# Patient Record
Sex: MALE | Race: WHITE | NOT HISPANIC OR LATINO | Employment: OTHER | URBAN - METROPOLITAN AREA
[De-identification: names, ages, dates, MRNs, and addresses within clinical notes are randomized per-mention and may not be internally consistent; named-entity substitution may affect disease eponyms.]

---

## 2023-01-30 ENCOUNTER — OFFICE VISIT (OUTPATIENT)
Dept: URGENT CARE | Facility: CLINIC | Age: 43
End: 2023-01-30
Payer: COMMERCIAL

## 2023-01-30 VITALS
WEIGHT: 245 LBS | BODY MASS INDEX: 36.29 KG/M2 | OXYGEN SATURATION: 98 % | DIASTOLIC BLOOD PRESSURE: 82 MMHG | SYSTOLIC BLOOD PRESSURE: 130 MMHG | HEART RATE: 76 BPM | HEIGHT: 69 IN | TEMPERATURE: 99 F

## 2023-01-30 DIAGNOSIS — L25.9 CONTACT DERMATITIS, UNSPECIFIED CONTACT DERMATITIS TYPE, UNSPECIFIED TRIGGER: Primary | ICD-10-CM

## 2023-01-30 PROCEDURE — 99202 PR OFFICE/OUTPT VISIT, NEW, LEVL II, 15-29 MIN: ICD-10-PCS | Mod: S$GLB,,, | Performed by: NURSE PRACTITIONER

## 2023-01-30 PROCEDURE — 99202 OFFICE O/P NEW SF 15 MIN: CPT | Mod: S$GLB,,, | Performed by: NURSE PRACTITIONER

## 2023-01-30 RX ORDER — PREDNISONE 20 MG/1
20 TABLET ORAL DAILY
Qty: 4 TABLET | Refills: 0 | Status: SHIPPED | OUTPATIENT
Start: 2023-01-30 | End: 2023-02-03

## 2023-01-30 NOTE — PROGRESS NOTES
"Subjective:       Patient ID: Wiliam Nagel is a 42 y.o. male.    Vitals:  height is 5' 9" (1.753 m) and weight is 111.1 kg (245 lb). His oral temperature is 98.5 °F (36.9 °C). His blood pressure is 130/82 and his pulse is 76. His oxygen saturation is 98%.     Chief Complaint: Rash    This is a 42 y.o. male who presents today with a chief complaint of rash on face, hands and groin area x 1 day.      Rash  This is a new problem. The current episode started yesterday. The problem is unchanged. The affected locations include the right hand and groin. The rash is characterized by itchiness. He was exposed to nothing. Past treatments include nothing. The treatment provided no relief.     Skin:  Positive for rash.     Objective:      Physical Exam   Constitutional: obesity  HENT:   Head: Normocephalic and atraumatic.   Nose: Nose normal.   Mouth/Throat: Mucous membranes are moist. Oropharynx is clear.   Eyes: Conjunctivae are normal. Pupils are equal, round, and reactive to light. Extraocular movement intact   Neck: Neck supple.   Cardiovascular: Normal rate, regular rhythm, normal heart sounds and normal pulses.   Pulmonary/Chest: Effort normal and breath sounds normal.   Abdominal: Normal appearance.   Musculoskeletal: Normal range of motion.         General: Normal range of motion.   Neurological: He is alert.   Skin: Skin is warm, dry and rash.         Comments: Mildly erythematous, pruritic, maculopapular rash to face, wrist area, and groin. Consistent in appearance with a contact dermatitis rash.   Psychiatric: His behavior is normal. Mood normal.   Vitals reviewed.      Assessment:       1. Contact dermatitis, unspecified contact dermatitis type, unspecified trigger          Plan:         Contact dermatitis, unspecified contact dermatitis type, unspecified trigger  -     predniSONE (DELTASONE) 20 MG tablet; Take 1 tablet (20 mg total) by mouth once daily. for 4 days  Dispense: 4 tablet; Refill: 0                     "

## 2023-08-22 ENCOUNTER — OFFICE VISIT (OUTPATIENT)
Dept: URGENT CARE | Facility: CLINIC | Age: 43
End: 2023-08-22

## 2023-08-22 VITALS
HEART RATE: 70 BPM | WEIGHT: 260 LBS | BODY MASS INDEX: 38.51 KG/M2 | HEIGHT: 69 IN | SYSTOLIC BLOOD PRESSURE: 136 MMHG | TEMPERATURE: 98 F | OXYGEN SATURATION: 98 % | DIASTOLIC BLOOD PRESSURE: 78 MMHG

## 2023-08-22 DIAGNOSIS — H60.502 ACUTE OTITIS EXTERNA OF LEFT EAR, UNSPECIFIED TYPE: Primary | ICD-10-CM

## 2023-08-22 PROCEDURE — 99213 PR OFFICE/OUTPT VISIT, EST, LEVL III, 20-29 MIN: ICD-10-PCS | Mod: S$GLB,,, | Performed by: NURSE PRACTITIONER

## 2023-08-22 PROCEDURE — 99213 OFFICE O/P EST LOW 20 MIN: CPT | Mod: S$GLB,,, | Performed by: NURSE PRACTITIONER

## 2023-08-22 RX ORDER — CEFDINIR 300 MG/1
300 CAPSULE ORAL 2 TIMES DAILY
COMMUNITY
Start: 2023-08-15 | End: 2023-09-01 | Stop reason: ALTCHOICE

## 2023-08-22 RX ORDER — ATORVASTATIN CALCIUM 20 MG/1
20 TABLET, FILM COATED ORAL NIGHTLY
COMMUNITY
Start: 2023-03-14 | End: 2023-08-30 | Stop reason: SDUPTHER

## 2023-08-22 RX ORDER — PSEUDOEPHEDRINE HCL 30 MG
30 TABLET ORAL 3 TIMES DAILY
COMMUNITY
Start: 2023-08-15 | End: 2023-09-01

## 2023-08-22 RX ORDER — CIPROFLOXACIN 500 MG/1
500 TABLET ORAL EVERY 12 HOURS
Qty: 14 TABLET | Refills: 0 | Status: SHIPPED | OUTPATIENT
Start: 2023-08-22 | End: 2023-08-29

## 2023-08-22 NOTE — PROGRESS NOTES
"Subjective:       Patient ID: Wiliam Nagel is a 43 y.o. male.    Vitals:  height is 5' 9" (1.753 m) and weight is 117.9 kg (260 lb). His oral temperature is 98.2 °F (36.8 °C). His blood pressure is 136/78 and his pulse is 70. His oxygen saturation is 98%.     Chief Complaint: Otalgia (Seen by ENT last week and given an steroid pack and an antibiotic.  X 3 days after getting water in left ear, ear feels clogged and has a slight pain.)    This is a 43 y.o. male who presents today with a chief complaint of left ear pain 3 days after swimming in ocean. Patient reports that he was seen by ENT last week and given antibiotic and steroid rx.    Otalgia   There is pain in the left ear. This is a new problem. The current episode started in the past 7 days. The problem has been unchanged. The pain is at a severity of 5/10. The pain is moderate. He has tried antibiotics (steroids) for the symptoms. The treatment provided moderate relief.       HENT:  Positive for ear pain.            Objective:      Physical Exam   Constitutional: He is oriented to person, place, and time. obesity  HENT:   Head: Normocephalic and atraumatic.   Ears:   Right Ear: Tympanic membrane, external ear and ear canal normal.      Comments: Left otitis externa.  Nose: Nose normal.   Mouth/Throat: Mucous membranes are moist. Oropharynx is clear.   Eyes: Conjunctivae are normal. Extraocular movement intact   Neck: Neck supple.   Cardiovascular: Normal rate, regular rhythm, normal heart sounds and normal pulses.   Pulmonary/Chest: Effort normal and breath sounds normal.   Abdominal: Normal appearance.   Musculoskeletal: Normal range of motion.         General: Normal range of motion.   Neurological: He is alert and oriented to person, place, and time.   Skin: Skin is warm and dry.   Psychiatric: His behavior is normal.   Vitals reviewed.        Past medical history and current medications reviewed.       Assessment:           1. Acute otitis externa of left " ear, unspecified type              Plan:     Stop antibiotic prescribed by ENT. Cipro as prescribed. Follow up with ENT asa advised.    Acute otitis externa of left ear, unspecified type  -     ciprofloxacin HCl (CIPRO) 500 MG tablet; Take 1 tablet (500 mg total) by mouth every 12 (twelve) hours. for 7 days  Dispense: 14 tablet; Refill: 0             There are no Patient Instructions on file for this visit.

## 2023-09-01 ENCOUNTER — OFFICE VISIT (OUTPATIENT)
Dept: URGENT CARE | Facility: CLINIC | Age: 43
End: 2023-09-01

## 2023-09-01 VITALS
SYSTOLIC BLOOD PRESSURE: 138 MMHG | HEIGHT: 69 IN | BODY MASS INDEX: 38.51 KG/M2 | TEMPERATURE: 98 F | WEIGHT: 260 LBS | DIASTOLIC BLOOD PRESSURE: 78 MMHG | HEART RATE: 68 BPM | OXYGEN SATURATION: 98 %

## 2023-09-01 DIAGNOSIS — H60.92 OTITIS EXTERNA OF LEFT EAR, UNSPECIFIED CHRONICITY, UNSPECIFIED TYPE: Primary | ICD-10-CM

## 2023-09-01 PROCEDURE — 99213 OFFICE O/P EST LOW 20 MIN: CPT | Mod: S$GLB,,, | Performed by: NURSE PRACTITIONER

## 2023-09-01 PROCEDURE — 99213 PR OFFICE/OUTPT VISIT, EST, LEVL III, 20-29 MIN: ICD-10-PCS | Mod: S$GLB,,, | Performed by: NURSE PRACTITIONER

## 2023-09-01 RX ORDER — NEOMYCIN SULFATE, POLYMYXIN B SULFATE AND HYDROCORTISONE 10; 3.5; 1 MG/ML; MG/ML; [USP'U]/ML
3 SUSPENSION/ DROPS AURICULAR (OTIC) 3 TIMES DAILY
Qty: 10 ML | Refills: 0 | Status: SHIPPED | OUTPATIENT
Start: 2023-09-01

## 2023-09-01 RX ORDER — PREDNISONE 10 MG/1
TABLET ORAL
Qty: 8 TABLET | Refills: 0 | Status: SHIPPED | OUTPATIENT
Start: 2023-09-01

## 2023-09-01 NOTE — PROGRESS NOTES
"Subjective:       Patient ID: Wiliam Nagel is a 43 y.o. male.    Vitals:  height is 5' 9" (1.753 m) and weight is 117.9 kg (260 lb). His oral temperature is 97.9 °F (36.6 °C). His blood pressure is 138/78 and his pulse is 68. His oxygen saturation is 98%.     Chief Complaint: ear feels clogged (Left ear pain getting worse over the last 4-5 weeks.  Seen 8/22/23 and it was getting better, then x 5 days ago  it started to get worse again.) and Gastroesophageal Reflux (States at night he's having reflux at night and wants to know what to take.)    This is a 43 y.o. male who presents today with a chief complaint of Left ear pain getting worse over the last 4-5 weeks.  Seen 8/22/23 and it was getting better, then x 5 days ago  it started to get worse again. Also, states at night he's having reflux at night and wants to know what to take.  Patient presents with:  ear feels clogged: Left ear pain getting worse over the last 4-5 weeks.  Seen 8/22/23 and it was getting better, then x 5 days ago  it started to get worse again.  Gastroesophageal Reflux: States at night he's having reflux at night and wants to know what to take.         Gastroesophageal Reflux  He complains of heartburn. He has tried nothing for the symptoms. The treatment provided no relief.       Gastrointestinal:  Positive for heartburn.           Objective:      Physical Exam   Constitutional: He is oriented to person, place, and time. He appears well-developed.   HENT:   Head: Normocephalic and atraumatic.   Ears:   Right Ear: External ear normal.   Left Ear: External ear normal.   Nose: Nose normal.   Mouth/Throat: Mucous membranes are normal.   Eyes: Conjunctivae and lids are normal.   Neck: Trachea normal. Neck supple.   Cardiovascular: Normal rate, regular rhythm and normal heart sounds.   Pulmonary/Chest: Effort normal and breath sounds normal. No respiratory distress.   Abdominal: Normal appearance and bowel sounds are normal. He exhibits no distension " and no mass. Soft. There is no abdominal tenderness.   Musculoskeletal: Normal range of motion.         General: Normal range of motion.   Neurological: He is alert and oriented to person, place, and time. He has normal strength.   Skin: Skin is warm, dry, intact, not diaphoretic and not pale.   Psychiatric: His speech is normal and behavior is normal. Judgment and thought content normal.   Nursing note and vitals reviewed.        Past medical history and current medications reviewed.       Assessment:           1. Otitis externa of left ear, unspecified chronicity, unspecified type              Plan:         Otitis externa of left ear, unspecified chronicity, unspecified type  -     predniSONE (DELTASONE) 10 MG tablet; Take 2 tabs today, then 1 tab po qd x 6 days  Dispense: 8 tablet; Refill: 0  -     neomycin-polymyxin-hydrocortisone (CORTISPORIN) 3.5-10,000-1 mg/mL-unit/mL-% otic suspension; Place 3 drops into the left ear 3 (three) times daily.  Dispense: 10 mL; Refill: 0  -     Ambulatory referral/consult to ENT             Patient Instructions     You must understand that you've received an Urgent Care treatment only and that you may be released before all your medical problems are known or treated. You, the patient, will arrange for follow up care as instructed.  Follow up with your PCP or specialty clinic as directed in the next 1-2 weeks if not improved or as needed.  You can call [phone] to schedule an appointment with the appropriate provider.  If your condition worsens we recommend that you receive another evaluation at the emergency room immediately or contact your primary medical clinics after hours call service to discuss your concerns.  Please return here or go to the Emergency Department for any concerns or worsening of condition.  Please if you smoke please consider quitting. [facility] Sm[facility] cessation hotline number is [phone], available at this number is free counseling and medications to  live a healthier life!       If you were prescribed a narcotic or controlled medication, do not drive or operate heavy equipment or machinery while taking these medications.    If you were not prescribed an antibiotic and your not better please return for a recheck. Antibiotic therapy is not always indicated initially.   Please attempt over the counter medications, give it time and try Echinacea, Zinc and Vitamin C to fight common colds and virus.

## 2023-09-01 NOTE — PATIENT INSTRUCTIONS
You must understand that you've received an Urgent Care treatment only and that you may be released before all your medical problems are known or treated. You, the patient, will arrange for follow up care as instructed.  Follow up with your PCP or specialty clinic as directed in the next 1-2 weeks if not improved or as needed.  You can call [phone] to schedule an appointment with the appropriate provider.  If your condition worsens we recommend that you receive another evaluation at the emergency room immediately or contact your primary medical clinics after hours call service to discuss your concerns.  Please return here or go to the Emergency Department for any concerns or worsening of condition.  Please if you smoke please consider quitting. [facility] Smoke cessation hotline number is [phone], available at this number is free counseling and medications to live a healthier life!       If you were prescribed a narcotic or controlled medication, do not drive or operate heavy equipment or machinery while taking these medications.    If you were not prescribed an antibiotic and your not better please return for a recheck. Antibiotic therapy is not always indicated initially.   Please attempt over the counter medications, give it time and try Echinacea, Zinc and Vitamin C to fight common colds and virus.

## 2023-09-05 ENCOUNTER — OFFICE VISIT (OUTPATIENT)
Dept: OTOLARYNGOLOGY | Facility: CLINIC | Age: 43
End: 2023-09-05

## 2023-09-05 VITALS
SYSTOLIC BLOOD PRESSURE: 150 MMHG | BODY MASS INDEX: 39.27 KG/M2 | WEIGHT: 265.88 LBS | DIASTOLIC BLOOD PRESSURE: 92 MMHG

## 2023-09-05 DIAGNOSIS — J32.9 CHRONIC SINUSITIS, UNSPECIFIED LOCATION: Primary | ICD-10-CM

## 2023-09-05 DIAGNOSIS — H60.532 ACUTE CONTACT OTITIS EXTERNA OF LEFT EAR: ICD-10-CM

## 2023-09-05 PROCEDURE — 99203 OFFICE O/P NEW LOW 30 MIN: CPT | Mod: S$PBB,,, | Performed by: OTOLARYNGOLOGY

## 2023-09-05 PROCEDURE — 99999 PR PBB SHADOW E&M-EST. PATIENT-LVL II: CPT | Mod: PBBFAC,,, | Performed by: OTOLARYNGOLOGY

## 2023-09-05 PROCEDURE — 99203 PR OFFICE/OUTPT VISIT, NEW, LEVL III, 30-44 MIN: ICD-10-PCS | Mod: S$PBB,,, | Performed by: OTOLARYNGOLOGY

## 2023-09-05 PROCEDURE — 99212 OFFICE O/P EST SF 10 MIN: CPT | Mod: PBBFAC,PN | Performed by: OTOLARYNGOLOGY

## 2023-09-05 PROCEDURE — 99999 PR PBB SHADOW E&M-EST. PATIENT-LVL II: ICD-10-PCS | Mod: PBBFAC,,, | Performed by: OTOLARYNGOLOGY

## 2023-09-05 RX ORDER — SULFAMETHOXAZOLE AND TRIMETHOPRIM 800; 160 MG/1; MG/1
1 TABLET ORAL 2 TIMES DAILY
Qty: 42 TABLET | Refills: 0 | Status: SHIPPED | OUTPATIENT
Start: 2023-09-05 | End: 2023-09-26

## 2023-09-05 NOTE — PROGRESS NOTES
Subjective:       Patient ID: Wiliam Nagel is a 43 y.o. male.    Chief Complaint: Otalgia (Pt c/o ear pain for a couple weeks after having a sinus infection. Pt has been seen at urgent care and  and has been prescribed different antibiotics and steroid which nothing seems to help. )      This 43-year-old for about six or seven weeks has been having a couple of problems that seemed to begin then with initially some left paranasal sinus pain left ear congestion but he never had any reduction in his hearing.  He was treated with antibiotics for presumed sinusitis he is had a couple of rounds of antibiotics and some steroids also they have kept the steroids light because of blood pressure issues although he does not have perfectly good records of exactly how much in what he has been given.    On the available electronic medical record I see that he recently was given Cortisporin suspension for his ear an appropriate choice for most external otitis if in fact that is a factor here.    Throughout our office visit he is sniffling and he tells me he has chronic sinus problems he has all his life he does have Astepro and Flonase that he bought over-the-counter but he is reluctant to use them for some reason which we have discussed.    Otalgia         Objective:      ENT Physical Exam  Constitutional  Appearance: patient appears well-developed, well-nourished and well-groomed,  Communication/Voice: communication appropriate for developmental age; vocal quality normal;  Head and Face  Appearance: head appears normal, face appears normal and face appears atraumatic;  Salivary: glands normal;  Ear  Hearing: intact;  Auricles: right auricle normal; left auricle normal;  Ear Canals: right ear canal normal;  Tympanic Membranes: right tympanic membrane normal;  Ear comments: On the left side is ear canal has a film of Cortisporin suspension but it does not have any swelling or debris or redness.  There is a little wax on the  right but I cleaned that out it is an incidental finding otherwise that looks fine  Nose  External Nose: nares patent bilaterally; external nose normal;  Internal Nose: septum normal; bilateral inferior turbinates normal;  Nose comments: His nose looks really quite congested there is no purulence but there was little film on the lateral nasal wall on the left of white material and he is sniffling throughout the entire visit  Oral Cavity/Oropharynx  Lips: normal;  Teeth: normal;  Gums: gingiva normal;  Tongue: normal;  Oral mucosa: normal;  Hard palate: normal;  Soft palate: normal;  Tonsils: normal;  Posterior pharyngeal wall: normal;  Neck  Neck: neck normal; neck palpation normal;  Thyroid: thyroid normal;  Lymphatic  Palpation: lymph nodes normal;          Assessment:       1. Chronic sinusitis, unspecified location    2. Acute contact otitis externa of left ear         Plan:          Some eye exactly sure but I bet he has low chronic sinusitis.  He tells me his hearing is fine so I do not think he is had otitis media and he has not noticed a hearing problem through any of this if he does have an external otitis it is without swelling or debris and there is a film of drop material that is seems to bother him in fact he did not use his drops today because he does not like the feeling of the liquid in his ear.    I have mentioned to him that he can rinse his ear out with vinegar water and I gave him a small syringe to expedite that just to get any buildup of eardrops out if that should bother him the way he mentions     With his nasal congestion we did discuss x-rays but I think he should just treat it for a longer period of time and I gave him three weeks of Bactrim and if that has not been helpful he is going to contact me and we will discuss x-rays her different antibiotics.  He has plenty of drops left and I have asked him to reinstitute the drops in case external otitis has a factor and if it is not a factor  it should be negative for whatever problems going on perhaps ear feelings from sinusitis.

## 2024-01-17 ENCOUNTER — OFFICE VISIT (OUTPATIENT)
Dept: URGENT CARE | Facility: CLINIC | Age: 44
End: 2024-01-17
Payer: COMMERCIAL

## 2024-01-17 VITALS
TEMPERATURE: 99 F | SYSTOLIC BLOOD PRESSURE: 140 MMHG | WEIGHT: 270 LBS | RESPIRATION RATE: 18 BRPM | DIASTOLIC BLOOD PRESSURE: 84 MMHG | HEART RATE: 83 BPM | OXYGEN SATURATION: 98 % | BODY MASS INDEX: 39.99 KG/M2 | HEIGHT: 69 IN

## 2024-01-17 DIAGNOSIS — J02.9 SORE THROAT: Primary | ICD-10-CM

## 2024-01-17 LAB
CTP QC/QA: YES
POC MOLECULAR INFLUENZA A AGN: NEGATIVE
POC MOLECULAR INFLUENZA B AGN: NEGATIVE

## 2024-01-17 PROCEDURE — 87502 INFLUENZA DNA AMP PROBE: CPT | Mod: QW,,, | Performed by: NURSE PRACTITIONER

## 2024-01-17 PROCEDURE — 99213 OFFICE O/P EST LOW 20 MIN: CPT | Mod: S$GLB,,, | Performed by: NURSE PRACTITIONER

## 2024-01-17 RX ORDER — PREDNISONE 10 MG/1
TABLET ORAL
Qty: 8 TABLET | Refills: 0 | Status: SHIPPED | OUTPATIENT
Start: 2024-01-17 | End: 2024-06-18

## 2024-01-17 RX ORDER — PROMETHAZINE HYDROCHLORIDE AND DEXTROMETHORPHAN HYDROBROMIDE 6.25; 15 MG/5ML; MG/5ML
5 SYRUP ORAL EVERY 4 HOURS PRN
Qty: 120 ML | Refills: 0 | Status: SHIPPED | OUTPATIENT
Start: 2024-01-17 | End: 2024-01-27

## 2024-01-17 RX ORDER — AMOXICILLIN AND CLAVULANATE POTASSIUM 875; 125 MG/1; MG/1
1 TABLET, FILM COATED ORAL 2 TIMES DAILY
COMMUNITY
Start: 2024-01-11 | End: 2024-06-13 | Stop reason: ALTCHOICE

## 2024-01-17 RX ORDER — FLUTICASONE PROPIONATE 50 MCG
1 SPRAY, SUSPENSION (ML) NASAL DAILY
Qty: 16 G | Refills: 0 | Status: SHIPPED | OUTPATIENT
Start: 2024-01-17

## 2024-01-17 NOTE — PROGRESS NOTES
"Subjective:       Patient ID: Roscoe Nagel is a 43 y.o. male.    Vitals:  height is 5' 9" (1.753 m) and weight is 122.5 kg (270 lb). His oral temperature is 98.5 °F (36.9 °C). His blood pressure is 140/84 (abnormal) and his pulse is 83. His respiration is 18 and oxygen saturation is 98%.     Chief Complaint: Sore Throat (Sore throat since Monday   Headache  fatigue since yesterday)    This is a 43 y.o. male who presents today with a chief complaint of  Patient presents with:  Sore Throat: Sore throat since Monday   Headache  fatigue since yesterday   Patient was at urgent care in [place] last week and received antibiotics there           Sore Throat   This is a new problem. The current episode started in the past 7 days. The problem has been waxing and waning. There has been no fever. Associated symptoms include congestion, coughing, diarrhea, headaches and shortness of breath. Treatments tried: mucinex     patient started taking amoxicillin as prescribed.       HENT:  Positive for congestion and sore throat.    Respiratory:  Positive for cough and shortness of breath.    Gastrointestinal:  Positive for diarrhea.   Neurological:  Positive for headaches.           Objective:      Physical Exam   Constitutional: He is oriented to person, place, and time. He appears well-developed.   HENT:   Head: Normocephalic and atraumatic.   Ears:   Right Ear: External ear normal.   Left Ear: External ear normal.   Nose: Nose normal.   Mouth/Throat: Mucous membranes are normal.   Eyes: Conjunctivae and lids are normal.   Neck: Trachea normal. Neck supple.   Cardiovascular: Normal rate, regular rhythm and normal heart sounds.   Pulmonary/Chest: Effort normal and breath sounds normal. No respiratory distress.   Abdominal: Normal appearance and bowel sounds are normal. He exhibits no distension and no mass. Soft. There is no abdominal tenderness.   Musculoskeletal: Normal range of motion.         General: Normal range of " motion.   Neurological: He is alert and oriented to person, place, and time. He has normal strength.   Skin: Skin is warm, dry, intact, not diaphoretic and not pale.   Psychiatric: His speech is normal and behavior is normal. Judgment and thought content normal.   Nursing note and vitals reviewed.        Past medical history and current medications reviewed.     We do not have any covid tests in clinic, suggested home covid testing.     Results for orders placed or performed in visit on 01/17/24   POCT Influenza A/B Molecular   Result Value Ref Range    POC Molecular Influenza A Ag Negative Negative, Not Reported    POC Molecular Influenza B Ag Negative Negative, Not Reported     Acceptable Yes        Assessment:           1. Sore throat              Plan:         Sore throat  -     POCT Influenza A/B Molecular  -     predniSONE (DELTASONE) 10 MG tablet; Take 2 tabs today, then 1 tab po qd x 6 days  Dispense: 8 tablet; Refill: 0  -     fluticasone propionate (FLONASE) 50 mcg/actuation nasal spray; 1 spray (50 mcg total) by Each Nostril route once daily.  Dispense: 16 g; Refill: 0  -     promethazine-dextromethorphan (PROMETHAZINE-DM) 6.25-15 mg/5 mL Syrp; Take 5 mLs by mouth every 4 (four) hours as needed.  Dispense: 120 mL; Refill: 0             Patient Instructions       You must understand that you've received an Urgent Care treatment only and that you may be released before all your medical problems are known or treated. You, the patient, will arrange for follow up care as instructed.  Follow up with your PCP or specialty clinic as directed in the next 1-2 weeks if not improved or as needed.  You can call [phone] to schedule an appointment with the appropriate provider.  If your condition worsens we recommend that you receive another evaluation at the emergency room immediately or contact your primary medical clinics after hours call service to discuss your concerns.  Please return here or  go to the Emergency Department for any concerns or worsening of condition.  Please if you smoke please consider quitting. [facility] Smoke cessation hotline number is [phone], available at this number is free counseling and medications to live a healthier life!       If you were prescribed a narcotic or controlled medication, do not drive or operate heavy equipment or machinery while taking these medications.    If you were not prescribed an antibiotic and your not better please return for a recheck. Antibiotic therapy is not always indicated initially.   Please attempt over the counter medications, give it time and try Echinacea, Zinc and Vitamin C to fight common colds and virus.

## 2024-01-17 NOTE — PATIENT INSTRUCTIONS
You must understand that you've received an Urgent Care treatment only and that you may be released before all your medical problems are known or treated. You, the patient, will arrange for follow up care as instructed.  Follow up with your PCP or specialty clinic as directed in the next 1-2 weeks if not improved or as needed.  You can call [phone] to schedule an appointment with the appropriate provider.  If your condition worsens we recommend that you receive another evaluation at the emergency room immediately or contact your primary medical clinics after hours call service to discuss your concerns.  Please return here or go to the Emergency Department for any concerns or worsening of condition.  Please if you smoke please consider quitting. [facility] Smoke cessation hotline number is [phone], available at this number is free counseling and medications to live a healthier life!       If you were prescribed a narcotic or controlled medication, do not drive or operate heavy equipment or machinery while taking these medications.    If you were not prescribed an antibiotic and your not better please return for a recheck. Antibiotic therapy is not always indicated initially.   Please attempt over the counter medications, give it time and try Echinacea, Zinc and Vitamin C to fight common colds and virus.

## 2024-01-17 NOTE — LETTER
January 17, 2024      [place] [facility]  [facility] [address]  [place] [address]  Phone: [phone]  Fax: [phone]       Patient: Roscoe Nagel   YOB: 1980  Date of Visit: 01/17/2024    To Whom It May Concern:    Lacie Nagel  was at [facility] on 01/17/2024. The patient may return to work/school on 01/18/24 with no restrictions. If you have any questions or concerns, or if I can be of further assistance, please do not hesitate to contact me.    Sincerely,    MARTHA Vega

## 2024-05-30 ENCOUNTER — OFFICE VISIT (OUTPATIENT)
Dept: URGENT CARE | Facility: CLINIC | Age: 44
End: 2024-05-30
Payer: COMMERCIAL

## 2024-05-30 VITALS
WEIGHT: 268.88 LBS | SYSTOLIC BLOOD PRESSURE: 132 MMHG | RESPIRATION RATE: 19 BRPM | OXYGEN SATURATION: 98 % | HEIGHT: 69 IN | HEART RATE: 77 BPM | DIASTOLIC BLOOD PRESSURE: 78 MMHG | BODY MASS INDEX: 39.82 KG/M2 | TEMPERATURE: 98 F

## 2024-05-30 DIAGNOSIS — B96.89 BACTERIAL SINUSITIS: Primary | ICD-10-CM

## 2024-05-30 DIAGNOSIS — J32.9 BACTERIAL SINUSITIS: Primary | ICD-10-CM

## 2024-05-30 PROCEDURE — 99213 OFFICE O/P EST LOW 20 MIN: CPT | Mod: S$GLB,,, | Performed by: NURSE PRACTITIONER

## 2024-05-30 RX ORDER — PROMETHAZINE HYDROCHLORIDE AND DEXTROMETHORPHAN HYDROBROMIDE 6.25; 15 MG/5ML; MG/5ML
5 SYRUP ORAL EVERY 4 HOURS PRN
Qty: 120 ML | Refills: 0 | OUTPATIENT
Start: 2024-05-30 | End: 2024-06-02

## 2024-05-30 RX ORDER — PREDNISONE 10 MG/1
TABLET ORAL
Qty: 8 TABLET | Refills: 0 | Status: SHIPPED | OUTPATIENT
Start: 2024-05-30 | End: 2024-06-18

## 2024-05-30 NOTE — PATIENT INSTRUCTIONS
You must understand that you've received an Urgent Care treatment only and that you may be released before all your medical problems are known or treated. You, the patient, will arrange for follow up care as instructed.  Follow up with your PCP or specialty clinic as directed in the next 1-2 weeks if not improved or as needed.  You can call [phone] to schedule an appointment with the appropriate provider.  If your condition worsens we recommend that you receive another evaluation at the emergency room immediately or contact your primary medical clinics after hours call service to discuss your concerns.  Please return here or go to the Emergency Department for any concerns or worsening of condition.  Please if you smoke please consider quitting. [facility] Smoke cessation hotline number is [phone], available at this number is free counseling and medications to live a healthier life!       If you were prescribed a narcotic or controlled medication, do not drive or operate heavy equipment or machinery while taking these medications.    If you were not prescribed an antibiotic and your not better please return for a recheck. Antibiotic therapy is not always indicated initially.   Please attempt over the counter medications, give it time and try Echinacea, Zinc and Vitamin C to fight common colds and virus.

## 2024-05-30 NOTE — PROGRESS NOTES
"Subjective:       Patient ID: Roscoe Nagel is a 44 y.o. male.    Vitals:  height is 5' 9" (1.753 m) and weight is 122 kg (268 lb 14.4 oz). His oral temperature is 98 °F (36.7 °C). His blood pressure is 132/78 and his pulse is 77. His respiration is 19 and oxygen saturation is 98%.     Chief Complaint: Cough    This is a 44 y.o. male who presents today with a chief complaint of 3 days with cough, headache, fatigue. Taking amoxicillin for his tooth and his girlfriend's cough syrup and sudafed.  Patient presents with:  Cough         Cough  This is a new problem. The current episode started in the past 7 days. The problem has been gradually worsening. The cough is Productive of sputum. Associated symptoms include headaches, nasal congestion and postnasal drip. Treatments tried: amoxicillin, sudafed, girlfriend's cough syrup. The treatment provided moderate relief.       HENT:  Positive for postnasal drip.    Respiratory:  Positive for cough.    Neurological:  Positive for headaches.           Objective:      Physical Exam   Constitutional: He is oriented to person, place, and time. He appears well-developed.   HENT:   Head: Normocephalic and atraumatic.   Ears:   Right Ear: External ear normal. Tympanic membrane is injected and bulging. A middle ear effusion is present.   Left Ear: External ear normal. Tympanic membrane is injected and bulging. A middle ear effusion is present.   Nose: Rhinorrhea and purulent discharge present.   Mouth/Throat: Mucous membranes are normal.   Eyes: Conjunctivae and lids are normal.   Neck: Trachea normal. Neck supple.   Cardiovascular: Normal rate, regular rhythm and normal heart sounds.   Pulmonary/Chest: Effort normal and breath sounds normal. No respiratory distress.   Abdominal: Normal appearance and bowel sounds are normal. He exhibits no distension and no mass. Soft. There is no abdominal tenderness.   Musculoskeletal: Normal range of motion.         General: Normal range of " motion.   Neurological: He is alert and oriented to person, place, and time. He has normal strength.   Skin: Skin is warm, dry, intact, not diaphoretic and not pale.   Psychiatric: His speech is normal and behavior is normal. Judgment and thought content normal.   Nursing note and vitals reviewed.        Past medical history and current medications reviewed.       Assessment:           1. Bacterial sinusitis              Plan:         Bacterial sinusitis  -     predniSONE (DELTASONE) 10 MG tablet; Take 2 tabs today, then 1 tab po qd x 6 days  Dispense: 8 tablet; Refill: 0  -     promethazine-dextromethorphan (PROMETHAZINE-DM) 6.25-15 mg/5 mL Syrp; Take 5 mLs by mouth every 4 (four) hours as needed.  Dispense: 120 mL; Refill: 0             Patient Instructions     You must understand that you've received an Urgent Care treatment only and that you may be released before all your medical problems are known or treated. You, the patient, will arrange for follow up care as instructed.  Follow up with your PCP or specialty clinic as directed in the next 1-2 weeks if not improved or as needed.  You can call [phone] to schedule an appointment with the appropriate provider.  If your condition worsens we recommend that you receive another evaluation at the emergency room immediately or contact your primary medical clinics after hours call service to discuss your concerns.  Please return here or go to the Emergency Department for any concerns or worsening of condition.  Please if you smoke please consider quitting. [facility] Smoke cessation hotline number is [phone], available at this number is free counseling and medications to live a healthier life!       If you were prescribed a narcotic or controlled medication, do not drive or operate heavy equipment or machinery while taking these medications.    If you were not prescribed an antibiotic and your not better please return for a recheck. Antibiotic therapy is not  always indicated initially.   Please attempt over the counter medications, give it time and try Echinacea, Zinc and Vitamin C to fight common colds and virus.

## 2024-06-02 ENCOUNTER — HOSPITAL ENCOUNTER (EMERGENCY)
Facility: HOSPITAL | Age: 44
Discharge: HOME | End: 2024-06-02
Attending: EMERGENCY MEDICINE
Payer: COMMERCIAL

## 2024-06-02 VITALS
TEMPERATURE: 98 F | RESPIRATION RATE: 16 BRPM | OXYGEN SATURATION: 100 % | HEART RATE: 72 BPM | SYSTOLIC BLOOD PRESSURE: 143 MMHG | DIASTOLIC BLOOD PRESSURE: 84 MMHG | HEIGHT: 69 IN | BODY MASS INDEX: 38.95 KG/M2 | WEIGHT: 263 LBS

## 2024-06-02 DIAGNOSIS — R05.9 COUGH: ICD-10-CM

## 2024-06-02 DIAGNOSIS — H10.9 CONJUNCTIVITIS OF LEFT EYE, UNSPECIFIED CONJUNCTIVITIS TYPE: ICD-10-CM

## 2024-06-02 DIAGNOSIS — J06.9 URI (UPPER RESPIRATORY INFECTION): Primary | ICD-10-CM

## 2024-06-02 PROCEDURE — 99900035 HC TECH TIME PER 15 MIN (STAT)

## 2024-06-02 PROCEDURE — 99900031 HC PATIENT EDUCATION (STAT)

## 2024-06-02 PROCEDURE — 99284 EMERGENCY DEPT VISIT MOD MDM: CPT | Mod: 25

## 2024-06-02 PROCEDURE — 94640 AIRWAY INHALATION TREATMENT: CPT

## 2024-06-02 PROCEDURE — 94761 N-INVAS EAR/PLS OXIMETRY MLT: CPT

## 2024-06-02 PROCEDURE — 25000242 PHARM REV CODE 250 ALT 637 W/ HCPCS: Performed by: NURSE PRACTITIONER

## 2024-06-02 PROCEDURE — 71046 X-RAY EXAM CHEST 2 VIEWS: CPT | Mod: TC

## 2024-06-02 PROCEDURE — 71046 X-RAY EXAM CHEST 2 VIEWS: CPT | Mod: 26,,, | Performed by: RADIOLOGY

## 2024-06-02 RX ORDER — ALBUTEROL SULFATE 90 UG/1
1-2 AEROSOL, METERED RESPIRATORY (INHALATION) EVERY 6 HOURS PRN
Qty: 6.7 G | Refills: 0 | Status: SHIPPED | OUTPATIENT
Start: 2024-06-02 | End: 2025-06-02

## 2024-06-02 RX ORDER — TOBRAMYCIN 3 MG/ML
1 SOLUTION/ DROPS OPHTHALMIC EVERY 4 HOURS
Qty: 5 ML | Refills: 0 | Status: SHIPPED | OUTPATIENT
Start: 2024-06-02 | End: 2024-06-18

## 2024-06-02 RX ORDER — CODEINE PHOSPHATE AND GUAIFENESIN 10; 100 MG/5ML; MG/5ML
5 SOLUTION ORAL EVERY 4 HOURS PRN
Qty: 100 ML | Refills: 0 | Status: SHIPPED | OUTPATIENT
Start: 2024-06-02 | End: 2024-06-18

## 2024-06-02 RX ORDER — IPRATROPIUM BROMIDE AND ALBUTEROL SULFATE 2.5; .5 MG/3ML; MG/3ML
3 SOLUTION RESPIRATORY (INHALATION)
Status: COMPLETED | OUTPATIENT
Start: 2024-06-02 | End: 2024-06-02

## 2024-06-02 RX ADMIN — IPRATROPIUM BROMIDE AND ALBUTEROL SULFATE 3 ML: .5; 2.5 SOLUTION RESPIRATORY (INHALATION) at 10:06

## 2024-06-13 ENCOUNTER — HOSPITAL ENCOUNTER (EMERGENCY)
Facility: HOSPITAL | Age: 44
Discharge: HOME | End: 2024-06-13
Attending: EMERGENCY MEDICINE
Payer: COMMERCIAL

## 2024-06-13 VITALS
WEIGHT: 263 LBS | BODY MASS INDEX: 38.95 KG/M2 | HEART RATE: 86 BPM | OXYGEN SATURATION: 98 % | HEIGHT: 69 IN | SYSTOLIC BLOOD PRESSURE: 134 MMHG | DIASTOLIC BLOOD PRESSURE: 90 MMHG | TEMPERATURE: 98 F | RESPIRATION RATE: 20 BRPM

## 2024-06-13 DIAGNOSIS — J01.90 ACUTE SINUSITIS, RECURRENCE NOT SPECIFIED, UNSPECIFIED LOCATION: Primary | ICD-10-CM

## 2024-06-13 LAB
GROUP A STREP, MOLECULAR: NEGATIVE
INFLUENZA A, MOLECULAR: NEGATIVE
INFLUENZA B, MOLECULAR: NEGATIVE
SARS-COV-2 RDRP RESP QL NAA+PROBE: NEGATIVE
SPECIMEN SOURCE: NORMAL

## 2024-06-13 PROCEDURE — 87651 STREP A DNA AMP PROBE: CPT

## 2024-06-13 PROCEDURE — 96372 THER/PROPH/DIAG INJ SC/IM: CPT

## 2024-06-13 PROCEDURE — 99284 EMERGENCY DEPT VISIT MOD MDM: CPT | Mod: 25

## 2024-06-13 PROCEDURE — 87502 INFLUENZA DNA AMP PROBE: CPT

## 2024-06-13 PROCEDURE — U0002 COVID-19 LAB TEST NON-CDC: HCPCS

## 2024-06-13 PROCEDURE — 63600175 PHARM REV CODE 636 W HCPCS

## 2024-06-13 RX ORDER — DEXAMETHASONE SODIUM PHOSPHATE 4 MG/ML
8 INJECTION, SOLUTION INTRA-ARTICULAR; INTRALESIONAL; INTRAMUSCULAR; INTRAVENOUS; SOFT TISSUE
Status: COMPLETED | OUTPATIENT
Start: 2024-06-13 | End: 2024-06-13

## 2024-06-13 RX ORDER — AMOXICILLIN AND CLAVULANATE POTASSIUM 875; 125 MG/1; MG/1
1 TABLET, FILM COATED ORAL 2 TIMES DAILY
Qty: 14 TABLET | Refills: 0 | Status: SHIPPED | OUTPATIENT
Start: 2024-06-13 | End: 2024-06-20

## 2024-06-13 RX ADMIN — DEXAMETHASONE SODIUM PHOSPHATE 8 MG: 4 INJECTION, SOLUTION INTRA-ARTICULAR; INTRALESIONAL; INTRAMUSCULAR; INTRAVENOUS; SOFT TISSUE at 03:06

## 2024-06-13 NOTE — FIRST PROVIDER EVALUATION
Emergency Department TeleTriage Encounter Note      CHIEF COMPLAINT    Chief Complaint   Patient presents with    Dental Pain     Dental abscess not getting better       VITAL SIGNS   Initial Vitals [06/13/24 1425]   BP Pulse Resp Temp SpO2   (!) 134/90 86 20 98.1 °F (36.7 °C) 98 %      MAP       --            ALLERGIES    Review of patient's allergies indicates:  No Known Allergies    PROVIDER TRIAGE NOTE  Patient presents with complaint of prolonged upper respiratory course.  Also reports recently being diagnosed with a dental abscess for which he feels is not improving and is contributing to his symptoms.  Denies any fevers.      Phy:   Constitutional: well nourished, well developed, appearing stated age, NAD        Initial orders will be placed and care will be transferred to an alternate provider when patient is roomed for a full evaluation. Any additional orders and the final disposition will be determined by that provider.        ORDERS  Labs Reviewed - No data to display    ED Orders (720h ago, onward)      None              Virtual Visit Note: The provider triage portion of this emergency department evaluation and documentation was performed via [employer], a HIPAA-compliant telemedicine application, in concert with a tele-presenter in the room. A face to face patient evaluation with one of my colleagues will occur once the patient is placed in an emergency department room.      DISCLAIMER: This note was prepared with [employer] voice recognition transcription software. Garbled syntax, mangled pronouns, and other bizarre constructions may be attributed to that software system.

## 2024-06-13 NOTE — DISCHARGE INSTRUCTIONS
You can alternate Tylenol ibuprofen as needed for pain.  Please take antibiotics as prescribed until gone.  Please follow up with primary care provider.  Please keep your scheduled oral maxillofacial surgeon appointment.    Please return to the ED for shortness of breath, difficulty swallowing, rash, vomiting, fever headaches, lightheaded dizziness, passing out, chest pain, or any or worsening concerns.

## 2024-06-14 NOTE — ED PROVIDER NOTES
Encounter Date: 6/13/2024       History     Chief Complaint   Patient presents with    Dental Pain     Dental abscess not getting better    Facial Pain     Patient is a 44 y.o. male with PMH of asthma who presents to ED via self for concern for sinus pain which began 2 week(s) ago.  Patient reports at the end may he was told he had an infection in his given amoxicillin at that time which he finished.  Patient reports he was found out that he had a dental abscess it was pushing in his maxillary sinus and it was referred to an oral surgeon.  Patient reports on 6 to he was ED visit and it was told he had an upper viral respiratory illness and conjunctivitis and was not given antibiotics at that time.  Patient reports he was appointment was on Monday but he missed the appointment.  Patient reports he was in [place] and it was having dry sinuses in his a sore throat.  Patient denies fever, chest pain, or shortness breath.  Patient denies headaches lightheaded dizziness.  Patient denies difficulty swallowing or drooling.  Patient denies rashes.  Patient denies vomiting.  Patient was awake and alert in no acute distress.         Review of patient's allergies indicates:  No Known Allergies  Past Medical History:   Diagnosis Date    Asthma     Hyperlipidemia      Past Surgical History:   Procedure Laterality Date    ELBOW SURGERY Left     nerve release     No family history on file.  Social History     Tobacco Use    Smoking status: Never     Passive exposure: Never    Smokeless tobacco: Never     Review of Systems   Constitutional: Negative.  Negative for fever.   HENT:  Positive for dental problem, ear pain, sinus pressure, sinus pain and sore throat. Negative for congestion, drooling, ear discharge, facial swelling, tinnitus, trouble swallowing and voice change.    Respiratory: Negative.  Negative for shortness of breath.    Cardiovascular: Negative.  Negative for chest pain.   Gastrointestinal: Negative.  Negative for  abdominal pain, nausea and vomiting.   Genitourinary: Negative.  Negative for dysuria.   Musculoskeletal: Negative.  Negative for back pain, neck pain and neck stiffness.   Skin: Negative.  Negative for color change, pallor and rash.   Neurological: Negative.  Negative for weakness.   Hematological:  Does not bruise/bleed easily.   Psychiatric/Behavioral: Negative.         Physical Exam     Initial Vitals [06/13/24 1425]   BP Pulse Resp Temp SpO2   (!) 134/90 86 20 98.1 °F (36.7 °C) 98 %      MAP       --         Physical Exam    Nursing note and vitals reviewed.  Constitutional: He appears well-developed and well-nourished. He is not diaphoretic.  Non-toxic appearance. He does not have a sickly appearance. He does not appear ill. No distress.   HENT:   Head: Normocephalic and atraumatic.   Right Ear: External ear and ear canal normal. Tympanic membrane is bulging.   Left Ear: External ear and ear canal normal. Tympanic membrane is bulging.   Nose: Nose normal.   Mouth/Throat: Uvula is midline and mucous membranes are normal. No trismus in the jaw. Normal dentition. No dental abscesses or uvula swelling. Posterior oropharyngeal erythema present. No oropharyngeal exudate, posterior oropharyngeal edema or tonsillar abscesses.   Eyes: Conjunctivae and EOM are normal.   Neck: Neck supple.   Normal range of motion.  Cardiovascular:  Normal rate, regular rhythm, normal heart sounds and intact distal pulses.     Exam reveals no gallop and no friction rub.       No murmur heard.  Pulmonary/Chest: Breath sounds normal. No respiratory distress. He has no wheezes. He has no rhonchi. He has no rales. He exhibits no tenderness.   Musculoskeletal:         General: Normal range of motion.      Cervical back: Normal range of motion and neck supple.     Neurological: He is alert and oriented to person, place, and time. He has normal strength. GCS score is 15. GCS eye subscore is 4. GCS verbal subscore is 5. GCS motor subscore is 6.    Skin: Skin is warm and dry. Capillary refill takes less than 2 seconds.   Psychiatric: He has a normal mood and affect. His behavior is normal. Judgment and thought content normal.         ED Course   Procedures  Labs Reviewed   GROUP A STREP, MOLECULAR   SARS-COV-2 RNA AMPLIFICATION, QUAL   INFLUENZA A AND B ANTIGEN    Narrative:     Specimen Source->Nasopharyngeal Swab          Imaging Results    None          Medications   dexAMETHasone injection 8 mg (8 mg Intramuscular Given 6/13/24 7351)     Medical Decision Making  MDM    Patient presents for emergent evaluation of acute dental pain that poses a possible threat to life and/or bodily function.    Differential diagnosis included but not limited to sinusitis, dental abscess, strep, covid, influenza, allergic reaction.  In the ED patient found to have acute clear lung sounds bilaterally with no increased work of breathing.  Patient has been erythematous posterior pharynx in his significant swelling or pustular exudate.  There has no peritonsillar abscess seen.  Patient was full range of motion of neck without difficulty or stiffness.  Patient has fluid noted behind bilateral TMs.  Patient denies sinus pain to palpation.  Patient reports some fullness in with bending and leaning forward.  Patient is awake and alert in no acute distress..      Discharge MDM  I discussed the patient presentation labs, findings with attending Dr. Galvan.    Patient was managed in the ED with IM dexamethasone.    The response to treatment was good.  Less likely diagnosis he was bacterial sinusitis as patient has been sick for greater than 10 days in his has not fully recovered.  Patient was be placed on Augmentin.  Discussed with the patient was have close follow up with ENT and his oral surgeon.  Patient verbalizes understanding.    Patient was discharged in stable condition.  Detailed return precautions discussed to return to the ED for any new or worsening concerns.  Patient  verbalized understanding.      Risk  OTC drugs.  Prescription drug management.                                      Clinical Impression:  Final diagnoses:  [J01.90] Acute sinusitis, recurrence not specified, unspecified location (Primary)          ED Disposition Condition    Discharge Stable          ED Prescriptions       Medication Sig Dispense Start Date End Date Auth. Provider    amoxicillin-clavulanate 875-125mg (AUGMENTIN) 875-125 mg per tablet Take 1 tablet by mouth 2 (two) times daily. for 7 days 14 tablet 6/13/2024 6/20/2024 Kim Naranjo NP          Follow-up Information       Follow up With Specialties Details Why Contact Info Additional Information    Mario Thayer MD Otolaryngology Schedule an appointment as soon as possible for a visit  For recheck/continuing care [id number]  [facility] EAR, NOSE AND [facility]  [facility] [address]  [phone]       [facility] - Emergency Dept Emergency Medicine  If symptoms worsen [address]  [facility] [id number] [phone] 1st floor             Kim Naranjo NP  06/14/24 5361

## 2024-06-18 ENCOUNTER — OFFICE VISIT (OUTPATIENT)
Dept: FAMILY MEDICINE | Facility: CLINIC | Age: 44
End: 2024-06-18
Payer: COMMERCIAL

## 2024-06-18 VITALS
HEIGHT: 69 IN | TEMPERATURE: 98 F | BODY MASS INDEX: 40.2 KG/M2 | DIASTOLIC BLOOD PRESSURE: 87 MMHG | OXYGEN SATURATION: 99 % | WEIGHT: 271.38 LBS | SYSTOLIC BLOOD PRESSURE: 139 MMHG | HEART RATE: 63 BPM

## 2024-06-18 DIAGNOSIS — E78.41 ELEVATED LIPOPROTEIN(A): ICD-10-CM

## 2024-06-18 DIAGNOSIS — J32.0 CHRONIC MAXILLARY SINUSITIS: ICD-10-CM

## 2024-06-18 DIAGNOSIS — Z00.00 PREVENTATIVE HEALTH CARE: ICD-10-CM

## 2024-06-18 DIAGNOSIS — G47.33 OSA ON CPAP: ICD-10-CM

## 2024-06-18 DIAGNOSIS — Z76.89 ENCOUNTER TO ESTABLISH CARE: Primary | ICD-10-CM

## 2024-06-18 PROCEDURE — 3079F DIAST BP 80-89 MM HG: CPT | Mod: CPTII,S$GLB,,

## 2024-06-18 PROCEDURE — 3075F SYST BP GE 130 - 139MM HG: CPT | Mod: CPTII,S$GLB,,

## 2024-06-18 PROCEDURE — 1160F RVW MEDS BY RX/DR IN RCRD: CPT | Mod: CPTII,S$GLB,,

## 2024-06-18 PROCEDURE — 99999 PR PBB SHADOW E&M-EST. PATIENT-LVL V: CPT | Mod: PBBFAC,,,

## 2024-06-18 PROCEDURE — 99204 OFFICE O/P NEW MOD 45 MIN: CPT | Mod: S$GLB,,,

## 2024-06-18 PROCEDURE — 1159F MED LIST DOCD IN RCRD: CPT | Mod: CPTII,S$GLB,,

## 2024-06-18 PROCEDURE — 3008F BODY MASS INDEX DOCD: CPT | Mod: CPTII,S$GLB,,

## 2024-06-18 RX ORDER — ATORVASTATIN CALCIUM 20 MG/1
20 TABLET, FILM COATED ORAL NIGHTLY
Qty: 90 TABLET | Refills: 0 | Status: SHIPPED | OUTPATIENT
Start: 2024-06-18

## 2024-06-18 RX ORDER — CETIRIZINE HYDROCHLORIDE 10 MG/1
10 TABLET ORAL DAILY
COMMUNITY

## 2024-06-18 NOTE — PROGRESS NOTES
SUBJECTIVE:      Patient ID: Roscoe Nagel is a 44 y.o. male.    Chief Complaint: ED f/u (Says he feels a little bitter but still has mucus in his throat)    Wiliam is a 44-year-old male, who is here today to establish care.  He has complaints recurrent sinus infection.  He was in the emergency room 5 days ago and was scheduled to follow up and establish care. PMH: YUMIKO with CPAP, Asthma, HLD, and acid reflux    He had like an ENT referral for chronic sinus infections.  Reports he has getting a sinus infection every 2-3 months.  He typically goes to an urgent care or treats over-the-counter.  He is seen a ENT years ago who wanted to correct his deviated septum.  He was seen in the emergency room 5 days ago- for dental pain in sinusitis.  He was told he had a dental abscess that could be possibly pushing into his maxillary sinus-patient reports.  He was started on Augmentin which he continues to take.  Zyrtec daily and Flonase daily.  With a dental abscess he is following up with the oral surgeon tomorrow and possibly having his tooth removed-reports decrease in pain since starting antibiotics.  Continues to have some sinus pressure and ear pressure, but does state that it has improved also since starting antibiotics.  Denies fever, chills, chest pain, chest tightness, coughing, wheezing, or rhinorrhea.  Endorses at times he has feeling that he needs to cough up possible mucus but it is dry.     Hyperlipidemia:  Needs refill for his Lipitor 20 mg.  Reports he has been taking this medication for 3 years.  Tolerating medication well and denies side effects. Denies CP, Chest tightness, joint pain, or myalgia.  Last lipid panel was 10 months ago, , trigs 115, HDL 49, LDL 86.  Compliance issues:  Med management and dietary intake.     Nonsmoker, Has 20 drinks beer/mixed drinks, denies illicit drugs. Exerceriec: 2-3 times a week. Diet general unhealthy.  Drinks about 4-5 bottles of water a day, also coffee and  energy drinks.           Review of patient's allergies indicates:  No Known Allergies   Past Medical History:   Diagnosis Date    Asthma     Hyperlipidemia     YUMIKO on CPAP 06/18/2024     Past Surgical History:   Procedure Laterality Date    ELBOW SURGERY Left 03/01/2024    nerve release     Current Outpatient Medications   Medication Sig Dispense Refill    albuterol (PROVENTIL/VENTOLIN HFA) 90 mcg/actuation inhaler Inhale 1-2 puffs into the lungs every 6 (six) hours as needed for Wheezing or Shortness of Breath. Rescue 6.7 g 0    amoxicillin-clavulanate 875-125mg (AUGMENTIN) 875-125 mg per tablet Take 1 tablet by mouth 2 (two) times daily. for 7 days 14 tablet 0    cetirizine (ZYRTEC) 10 MG tablet Take 10 mg by mouth once daily.      fluticasone propionate (FLONASE) 50 mcg/actuation nasal spray 1 spray (50 mcg total) by Each Nostril route once daily. 16 g 0    atorvastatin (LIPITOR) 20 MG tablet Take 1 tablet (20 mg total) by mouth every evening. 90 tablet 0     No current facility-administered medications for this visit.     Family History   Problem Relation Name Age of Onset    Cancer Mother      Diabetes Father        Social History     Socioeconomic History    Marital status: Significant Other   Tobacco Use    Smoking status: Never     Passive exposure: Never    Smokeless tobacco: Never   Substance and Sexual Activity    Alcohol use: Yes     Alcohol/week: 5.0 standard drinks of alcohol     Types: 5 Cans of beer per week    Drug use: Not Currently     Types: Cocaine    Sexual activity: Yes     Partners: Female     Social Determinants of Health     Financial Resource Strain: Low Risk  (6/17/2024)    Overall Financial Resource Strain (CARDIA)     Difficulty of Paying Living Expenses: Not hard at all   Food Insecurity: No Food Insecurity (6/17/2024)    Hunger Vital Sign     Worried About Running Out of Food in the Last Year: Never true     Ran Out of Food in the Last Year: Never true   Physical Activity:  "Insufficiently Active (6/17/2024)    Exercise Vital Sign     Days of Exercise per Week: 3 days     Minutes of Exercise per Session: 40 min   Stress: Stress Concern Present (6/17/2024)    [employer] - Occupational Stress Questionnaire     Feeling of Stress : Rather much   Housing Stability: Unknown (6/17/2024)    Housing Stability Vital Sign     Unable to Pay for Housing in the Last Year: No       Review of Systems   Constitutional:  Negative for appetite change, chills, fatigue, fever and unexpected weight change.   HENT:  Positive for nasal congestion, dental problem, postnasal drip, sinus pressure/congestion and sore throat. Negative for ear pain, rhinorrhea, trouble swallowing and voice change.    Respiratory:  Negative for cough, chest tightness, shortness of breath, wheezing and stridor.    Cardiovascular:  Negative for chest pain and palpitations.   Gastrointestinal:  Negative for abdominal distention, constipation, diarrhea, nausea and vomiting.   Endocrine: Negative for cold intolerance and heat intolerance.   Genitourinary:  Negative for difficulty urinating.   Musculoskeletal:  Negative for arthralgias and myalgias.   Integumentary:  Negative for rash and wound.   Allergic/Immunologic: Negative for environmental allergies.   Neurological:  Negative for weakness, light-headedness and headaches.   Hematological:  Negative for adenopathy.   Psychiatric/Behavioral:  Negative for dysphoric mood, self-injury and suicidal ideas.       OBJECTIVE:      Vitals:    06/18/24 1032   BP: 139/87   BP Location: Right arm   Patient Position: Sitting   BP Method: Large (Automatic)   Pulse: 63   Temp: 98.3 °F (36.8 °C)   TempSrc: Oral   SpO2: 99%   Weight: 123.1 kg (271 lb 6.4 oz)   Height: 5' 9" (1.753 m)     Physical Exam  Vitals and nursing note reviewed.   Constitutional:       General: He is not in acute distress.     Appearance: Normal appearance. He is well-developed. He is not " ill-appearing.   HENT:      Head: Normocephalic and atraumatic.      Right Ear: Tympanic membrane, ear canal and external ear normal.      Left Ear: Tympanic membrane, ear canal and external ear normal.      Nose: Congestion present. No nasal tenderness or rhinorrhea.      Right Turbinates: Swollen.      Left Turbinates: Swollen.      Right Sinus: No maxillary sinus tenderness or frontal sinus tenderness.      Left Sinus: No maxillary sinus tenderness or frontal sinus tenderness.      Mouth/Throat:      Mouth: Mucous membranes are moist.      Dentition: Abnormal dentition. Dental tenderness present.      Pharynx: Oropharynx is clear. Uvula midline. Posterior oropharyngeal erythema ([place]) present. No oropharyngeal exudate.   Eyes:      General: No scleral icterus.        Right eye: No discharge.         Left eye: No discharge.      Extraocular Movements: Extraocular movements intact.      Conjunctiva/sclera: Conjunctivae normal.      Pupils: Pupils are equal, round, and reactive to light.   Neck:      Thyroid: No thyroid mass or thyromegaly.      Trachea: Trachea normal.   Cardiovascular:      Rate and Rhythm: Normal rate and regular rhythm.      Heart sounds: Normal heart sounds. No murmur heard.  Pulmonary:      Effort: Pulmonary effort is normal. No respiratory distress.      Breath sounds: Normal breath sounds. No wheezing, rhonchi or rales.   Musculoskeletal:         General: Normal range of motion.      Cervical back: Normal range of motion and neck supple.   Lymphadenopathy:      Cervical: No cervical adenopathy.   Skin:     General: Skin is warm and dry.      Coloration: Skin is not pale.      Findings: No rash.   Neurological:      Mental Status: He is alert and oriented to person, place, and time.   Psychiatric:         Mood and Affect: Mood normal.         Behavior: Behavior normal.         Thought Content: Thought content normal.         Judgment: Judgment normal.        Assessment:       1.  Encounter to establish care    2. Chronic maxillary sinusitis    3. YUMIKO on CPAP    4. Preventative health care    5. Elevated lipoprotein(a)        Plan:       Encounter to establish care    Chronic maxillary sinusitis  Typically, a viral infection that can be treated with supportive care, after 10 days then concern for a secondary bacterial infection. Please continue taking antibiotics as prescribed and complete them.  CT ordered to be completed before following up with ENT.    Please initiate supportive care: increase oral fluids, steamy showers, elevate HOB when sleeping, use of humidifier.  Symptomatic treatment includes regular mucinex for 7 days, decongestants no more than 3 days, NSAIDs as need for fever and pain, saline nasal rinse and Flonase 1 spray each nostril AM & PM for 2 weeks, and daily antihistamine (Claritin or Zyrtec)     -     CT Sinuses without Contrast; Future; Expected date: 06/18/2024  -     Ambulatory referral/consult to ENT; Future; Expected date: 06/25/2024    YUMIKO on CPAP  Compliant.  Continue using CPAP machine.    Preventative health care  -     CBC Auto Differential; Future; Expected date: 06/18/2024  -     Comprehensive Metabolic Panel; Future; Expected date: 06/18/2024  -     Lipid Panel; Future; Expected date: 06/18/2024    Elevated lipoprotein(a)  -continues taking Lipitor 20 mg as prescribed.  -Limit: red meat, butter, fried foods, cheese, and other food with high saturated fat contents.  -Consume more: lean meats, fish, fruits, vegetables, whole grains, beans, lentils, and nuts.  -Increase fiber: oatmeal, bran, or fiber supplement.   -Weight loss, 30-45 min of cardiovascular exercise daily, DM control, BP control, smoking cessation, and limiting alcohol intake   -     atorvastatin (LIPITOR) 20 MG tablet; Take 1 tablet (20 mg total) by mouth every evening.  Dispense: 90 tablet; Refill: 0  -     Lipid Panel; Future; Expected date: 06/18/2024        -labs ordered to be completed  before annual visit            Follow up in about 1 month (around 7/18/2024) for Annual.      6/18/2024 MELECIO Feliciano, MARTHA    This note was created using [employer] voice recognition software that occasionally misinterprets phrases or words.

## 2024-06-18 NOTE — PATIENT INSTRUCTIONS
Typically, a viral infection that can be treated with supportive care, after 10 days then concern for a secondary bacterial infection. Please take antibiotics as prescribed and complete them.     Please initiate supportive care: increase oral fluids, steamy showers, elevate HOB when sleeping, use of humidifier.  Symptomatic treatment includes regular mucinex 1200mg twice  for 7 days, decongestants no more than 3 days, NSAIDs as need for fever and pain, saline nasal rinse and Flonase 1 spray each nostril AM & PM for 2 weeks, and daily antihistamine (Claritin or Zyrtec)

## 2024-06-21 ENCOUNTER — HOSPITAL ENCOUNTER (OUTPATIENT)
Dept: RADIOLOGY | Facility: HOSPITAL | Age: 44
Discharge: HOME | End: 2024-06-21
Payer: COMMERCIAL

## 2024-06-21 DIAGNOSIS — J32.0 CHRONIC MAXILLARY SINUSITIS: ICD-10-CM

## 2024-06-21 PROCEDURE — 70486 CT MAXILLOFACIAL W/O DYE: CPT | Mod: TC

## 2024-06-21 PROCEDURE — 70486 CT MAXILLOFACIAL W/O DYE: CPT | Mod: 26,,, | Performed by: RADIOLOGY

## 2024-06-24 ENCOUNTER — TELEPHONE (OUTPATIENT)
Dept: FAMILY MEDICINE | Facility: CLINIC | Age: 44
End: 2024-06-24
Payer: COMMERCIAL

## 2024-06-24 NOTE — TELEPHONE ENCOUNTER
----- Message from Tanna Daugherty NP sent at 6/23/2024  8:05 PM CDT -----  Ct of sinus shows sinus disease and septum deviation-please remind patient to follow up with ENT. Thank you.

## 2024-06-24 NOTE — TELEPHONE ENCOUNTER
Called patient to discuss results of sinus CT. Received voicemail, left message and call back number.

## 2024-07-29 ENCOUNTER — TELEPHONE (OUTPATIENT)
Dept: FAMILY MEDICINE | Facility: CLINIC | Age: 44
End: 2024-07-29
Payer: COMMERCIAL

## 2024-08-27 ENCOUNTER — OFFICE VISIT (OUTPATIENT)
Dept: FAMILY MEDICINE | Facility: CLINIC | Age: 44
End: 2024-08-27
Payer: COMMERCIAL

## 2024-08-27 VITALS
DIASTOLIC BLOOD PRESSURE: 84 MMHG | SYSTOLIC BLOOD PRESSURE: 124 MMHG | OXYGEN SATURATION: 98 % | TEMPERATURE: 98 F | HEART RATE: 58 BPM | WEIGHT: 268.31 LBS | BODY MASS INDEX: 39.62 KG/M2

## 2024-08-27 DIAGNOSIS — Z00.01 ABNORMAL PHYSICAL EVALUATION: Primary | ICD-10-CM

## 2024-08-27 DIAGNOSIS — Z23 NEED FOR TETANUS BOOSTER: ICD-10-CM

## 2024-08-27 DIAGNOSIS — E78.41 ELEVATED LIPOPROTEIN(A): ICD-10-CM

## 2024-08-27 DIAGNOSIS — G47.33 OSA ON CPAP: ICD-10-CM

## 2024-08-27 DIAGNOSIS — K21.9 LARYNGOPHARYNGEAL REFLUX (LPR): ICD-10-CM

## 2024-08-27 PROCEDURE — 3074F SYST BP LT 130 MM HG: CPT | Mod: CPTII,S$GLB,,

## 2024-08-27 PROCEDURE — 1159F MED LIST DOCD IN RCRD: CPT | Mod: CPTII,S$GLB,,

## 2024-08-27 PROCEDURE — 90715 TDAP VACCINE 7 YRS/> IM: CPT | Mod: S$GLB,,,

## 2024-08-27 PROCEDURE — 90471 IMMUNIZATION ADMIN: CPT | Mod: S$GLB,,,

## 2024-08-27 PROCEDURE — 99999 PR PBB SHADOW E&M-EST. PATIENT-LVL IV: CPT | Mod: PBBFAC,,,

## 2024-08-27 PROCEDURE — 1160F RVW MEDS BY RX/DR IN RCRD: CPT | Mod: CPTII,S$GLB,,

## 2024-08-27 PROCEDURE — 99396 PREV VISIT EST AGE 40-64: CPT | Mod: 25,S$GLB,,

## 2024-08-27 PROCEDURE — 3079F DIAST BP 80-89 MM HG: CPT | Mod: CPTII,S$GLB,,

## 2024-08-27 PROCEDURE — 3008F BODY MASS INDEX DOCD: CPT | Mod: CPTII,S$GLB,,

## 2024-08-27 RX ORDER — ATORVASTATIN CALCIUM 20 MG/1
20 TABLET, FILM COATED ORAL NIGHTLY
Qty: 90 TABLET | Refills: 3 | Status: SHIPPED | OUTPATIENT
Start: 2024-08-27

## 2024-08-27 RX ORDER — OMEPRAZOLE 40 MG/1
40 CAPSULE, DELAYED RELEASE ORAL
COMMUNITY
Start: 2024-08-05 | End: 2024-08-27

## 2024-08-27 RX ORDER — FAMOTIDINE 40 MG/1
40 TABLET, FILM COATED ORAL NIGHTLY
COMMUNITY
Start: 2024-07-09

## 2024-08-27 NOTE — PROGRESS NOTES
"  SUBJECTIVE:   HPI: Roscoe Nagel  is a 44 y.o. male who presents for annual physical .   Annual Exam    Roscoe is a 44yr male, who is an established patient. He presents today for his annual visit. His chronic conditions consist of HLD, YUMIKO, and LPR. HLD is controlled with Lipitor 20mg daily, tolerating will without SE. LPR was diagnosed by ENT, Dr. Thayer, during his evaluation of chronic congestion and "sinus issues." He was treated with pepcid 30 days and priloec for 90 days. He will be followinng up today with Dr. Thayer to check for evaluation and assess for an allergy testing. Patient was also diagnoses about 10 years ago with YUMIKO and has been compliant with his CPAP. He is interested to see if his setting are still appropriate and get more information about the inspired device. Will refer to Dr. Doss for eval. No acute complatins today.      Social history: Self employed- . In a relationship. No children of his own. Lives with girlfriend and her teenage son. Reports having a good support between friends and family.   Diet: General unhealthy. Consumes of Mexican food often (high sodium and fat).    Exercise: 2 times a week- Kick boxing and cross fit  Smoke: Denies   ETOH use: 20 drinks a week between a drink for lunch and sports on the weekends  Illicit drug use: Denies    Health Maintenances  Eye exam: Regular eye exams, had last one last week. Prescription glasses.   Dental: Regular 6 months cleaning. No complaints currently  Colon Cancer Screening: Discussed screening will start next yr at 45.   Vaccination: Needs tetanus         (Not in a hospital admission)    Review of patient's allergies indicates:  No Known Allergies  Current Outpatient Medications on File Prior to Visit   Medication Sig Dispense Refill    famotidine (PEPCID) 40 MG tablet Take 40 mg by mouth every evening.      fluticasone propionate (FLONASE) 50 mcg/actuation nasal spray 1 spray (50 mcg total) by Each " Nostril route once daily. 16 g 0    [DISCONTINUED] atorvastatin (LIPITOR) 20 MG tablet Take 1 tablet (20 mg total) by mouth every evening. 90 tablet 0    albuterol (PROVENTIL/VENTOLIN HFA) 90 mcg/actuation inhaler Inhale 1-2 puffs into the lungs every 6 (six) hours as needed for Wheezing or Shortness of Breath. Rescue (Patient not taking: Reported on 8/27/2024) 6.7 g 0    [DISCONTINUED] cetirizine (ZYRTEC) 10 MG tablet Take 10 mg by mouth once daily. (Patient not taking: Reported on 8/27/2024)      [DISCONTINUED] omeprazole (PRILOSEC) 40 MG capsule Take 40 mg by mouth. (Patient not taking: Reported on 8/27/2024)       No current facility-administered medications on file prior to visit.     Past Medical History:   Diagnosis Date    Asthma     Hyperlipidemia     YUMIKO on CPAP 06/18/2024     Past Surgical History:   Procedure Laterality Date    ELBOW SURGERY Left 03/01/2024    nerve release     Family History   Problem Relation Name Age of Onset    Cancer Mother      Diabetes Father       Social History     Tobacco Use    Smoking status: Never     Passive exposure: Never    Smokeless tobacco: Never   Substance Use Topics    Alcohol use: Yes     Alcohol/week: 5.0 standard drinks of alcohol     Types: 5 Cans of beer per week    Drug use: Not Currently     Types: Cocaine      Health Maintenance Topics with due status: Not Due       Topic Last Completion Date    Hemoglobin A1c (Diabetic Prevention Screening) 08/03/2023    Lipid Panel 08/15/2024    TETANUS VACCINE 08/27/2024    Influenza Vaccine Not Due     Immunization History   Administered Date(s) Administered    COVID-19, MRNA, LN-S, PF (Pfizer) (Purple Cap) 04/14/2021, 05/05/2021    Tdap 08/27/2024       Lab Visit on 08/15/2024   Component Date Value Ref Range Status    WBC 08/15/2024 6.6  3.4 - 10.8 x10E3/uL Final    RBC 08/15/2024 4.95  4.14 - 5.80 x10E6/uL Final    Hemoglobin 08/15/2024 16.1  13.0 - 17.7 g/dL Final    Hematocrit 08/15/2024 47.9  37.5 - 51.0 % Final     MCV 08/15/2024 97  79 - 97 fL Final    MCH 08/15/2024 32.5  26.6 - 33.0 pg Final    MCHC 08/15/2024 33.6  31.5 - 35.7 g/dL Final    RDW 08/15/2024 12.5  11.6 - 15.4 % Final    Platelets 08/15/2024 212  150 - 450 x10E3/uL Final    Neutrophils 08/15/2024 54  Not Estab. % Final    Lymphs 08/15/2024 32  Not Estab. % Final    Monocytes 08/15/2024 10  Not Estab. % Final    Eos 08/15/2024 3  Not Estab. % Final    Basos 08/15/2024 1  Not Estab. % Final    Neutrophils (Absolute) 08/15/2024 3.5  1.4 - 7.0 x10E3/uL Final    Lymphs (Absolute) 08/15/2024 2.1  0.7 - 3.1 x10E3/uL Final    Monocytes(Absolute) 08/15/2024 0.7  0.1 - 0.9 x10E3/uL Final    Eos (Absolute) 08/15/2024 0.2  0.0 - 0.4 x10E3/uL Final    Baso (Absolute) 08/15/2024 0.0  0.0 - 0.2 x10E3/uL Final    Immature Granulocytes 08/15/2024 0  Not Estab. % Final    Immature Grans (Abs) 08/15/2024 0.0  0.0 - 0.1 x10E3/uL Final    Cholesterol 08/15/2024 145  100 - 199 mg/dL Final    Triglycerides 08/15/2024 86  0 - 149 mg/dL Final    HDL 08/15/2024 49  >39 mg/dL Final    VLDL Cholesterol Jerzy 08/15/2024 16  5 - 40 mg/dL Final    LDL Calculated 08/15/2024 80  0 - 99 mg/dL Final    Glucose 08/15/2024 101 (H)  70 - 99 mg/dL Final    BUN 08/15/2024 19  6 - 24 mg/dL Final    Creatinine 08/15/2024 1.07  0.76 - 1.27 mg/dL Final    eGFR 08/15/2024 88  >59 mL/min/1.73 Final    BUN/Creatinine Ratio 08/15/2024 18  9 - 20 Final    Sodium 08/15/2024 141  134 - 144 mmol/L Final    Potassium 08/15/2024 4.2  3.5 - 5.2 mmol/L Final    Chloride 08/15/2024 103  96 - 106 mmol/L Final    CO2 08/15/2024 23  20 - 29 mmol/L Final    Calcium 08/15/2024 9.2  8.7 - 10.2 mg/dL Final    Protein, Total 08/15/2024 6.4  6.0 - 8.5 g/dL Final    Albumin 08/15/2024 4.4  4.1 - 5.1 g/dL Final    Globulin, Total 08/15/2024 2.0  1.5 - 4.5 g/dL Final    Total Bilirubin 08/15/2024 1.6 (H)  0.0 - 1.2 mg/dL Final    Alkaline Phosphatase 08/15/2024 43 (L)  44 - 121 IU/L Final    AST 08/15/2024 26  0 - 40 IU/L Final     ALT 08/15/2024 29  0 - 44 IU/L Final   Admission on 06/13/2024, Discharged on 06/13/2024   Component Date Value Ref Range Status    SARS-CoV-2 RNA, Amplification, Qual 06/13/2024 Negative  Negative Final    Influenza A, Molecular 06/13/2024 Negative  Negative Final    Influenza B, Molecular 06/13/2024 Negative  Negative Final    Flu A & B Source 06/13/2024 Nasal swab   Final    Group A Strep, Molecular 06/13/2024 Negative  Negative Final   Office Visit on 01/17/2024   Component Date Value Ref Range Status    POC Molecular Influenza A Ag 01/17/2024 Negative  Negative, Not Reported Final    POC Molecular Influenza B Ag 01/17/2024 Negative  Negative, Not Reported Final     Acceptable 01/17/2024 Yes   Final       Review of Systems   Constitutional:  Negative for chills, fatigue, fever and unexpected weight change.   HENT:  Negative for nasal congestion, ear pain, postnasal drip, rhinorrhea, sinus pressure/congestion, sneezing, sore throat and trouble swallowing.    Eyes:  Negative for pain, redness and visual disturbance.   Respiratory:  Negative for cough, chest tightness, shortness of breath and wheezing.    Cardiovascular:  Negative for chest pain, palpitations and leg swelling.   Gastrointestinal:  Negative for abdominal pain, blood in stool, change in bowel habit, constipation, diarrhea, nausea, vomiting and reflux.   Endocrine: Negative for polydipsia, polyphagia and polyuria.   Genitourinary:  Negative for bladder incontinence, difficulty urinating, dysuria, frequency, hematuria and urgency.   Musculoskeletal:  Negative for arthralgias, back pain, myalgias and neck pain.   Integumentary:  Negative for rash and wound.   Neurological:  Negative for dizziness, syncope, speech difficulty, weakness, light-headedness, numbness and headaches.   Psychiatric/Behavioral:  Negative for confusion, dysphoric mood, self-injury, sleep disturbance and suicidal ideas.       OBJECTIVE:      Vitals:    08/27/24  1043   BP: 124/84   BP Location: Left arm   Patient Position: Sitting   BP Method: Large (Manual)   Pulse: (!) 58   Temp: 98.3 °F (36.8 °C)   TempSrc: Oral   SpO2: 98%   Weight: 121.7 kg (268 lb 4.8 oz)     Physical Exam  Vitals and nursing note reviewed.   Constitutional:       General: He is not in acute distress.     Appearance: Normal appearance. He is well-developed. He is obese. He is not ill-appearing.   HENT:      Head: Normocephalic and atraumatic.      Right Ear: Tympanic membrane, ear canal and external ear normal.      Left Ear: Tympanic membrane, ear canal and external ear normal.      Nose: Rhinorrhea present.      Mouth/Throat:      Mouth: Mucous membranes are moist.      Pharynx: Oropharynx is clear. No oropharyngeal exudate.   Eyes:      General: No scleral icterus.     Extraocular Movements: Extraocular movements intact.      Conjunctiva/sclera: Conjunctivae normal.      Pupils: Pupils are equal, round, and reactive to light.   Neck:      Thyroid: No thyroid mass or thyromegaly.      Trachea: Trachea normal.   Cardiovascular:      Rate and Rhythm: Normal rate and regular rhythm.      Pulses: Normal pulses.      Heart sounds: Normal heart sounds. No murmur heard.  Pulmonary:      Effort: Pulmonary effort is normal. No respiratory distress.      Breath sounds: Normal breath sounds. No wheezing, rhonchi or rales.   Abdominal:      General: Bowel sounds are normal. There is no distension.      Palpations: Abdomen is soft. There is no mass.      Tenderness: There is no abdominal tenderness.   Musculoskeletal:         General: Normal range of motion.      Cervical back: Normal range of motion and neck supple.   Lymphadenopathy:      Cervical: No cervical adenopathy.   Skin:     General: Skin is warm and dry.      Coloration: Skin is not pale.      Findings: No rash.   Neurological:      Mental Status: He is alert and oriented to person, place, and time.   Psychiatric:         Mood and Affect: Mood  normal.         Behavior: Behavior normal.         Thought Content: Thought content normal.         Judgment: Judgment normal.        Assessment:       1. Abnormal physical evaluation    2. YUMIKO on CPAP    3. Need for tetanus booster    4. Laryngopharyngeal reflux (LPR)    5. Elevated lipoprotein(a)    6. BMI 39.0-39.9,adult        Plan:       Abnormal physical evaluation  Reviewed labs. Elevated bilirubin is stable and has been the same for 20 years.     YUMIKO on CPAP  -     Ambulatory referral/consult to Sleep Disorders; Future; Expected date: 09/03/2024    Need for tetanus booster  -     Tdap (BOOSTRIX) vaccine injection 0.5 mL    Laryngopharyngeal reflux (LPR)  Continue current treatment plan and follow up with ENT as scheduled. Dietary changes are exteremly important.     Elevated lipoprotein(a)  Controlled. Continue Lipitor 20mg daily. Risk factors associated with HLD (stroke, MI, etc.), lifestyle modifications, and medication management.    -Limit: red meat, butter, fried foods, cheese, and other food with high saturated fat contents.  -Consume more: lean meats, fish, fruits, vegetables, whole grains, beans, lentils, and nuts.  -Increase fiber: oatmeal, bran, or fiber supplement.   -Weight loss, 30-45 min of cardiovascular exercise daily  and limiting alcohol intake   -     atorvastatin (LIPITOR) 20 MG tablet; Take 1 tablet (20 mg total) by mouth every evening.  Dispense: 90 tablet; Refill: 3    BMI 39.0-39.9,adult  Diet and exercise discussed with patient.  -Consuming 500-1,000 fewer calories per day to lose 1-2 lbs per week   -Lower carbohydrates & saturated fats. Increase fiber with fruits, vegetables and whole grain products. Limit caffeine intake and Increase fluids.  Portion control- Eat when hungry and stop when satisfied.   -Aerobic exercise such as walking, running, biking or swimming 30-45 minutes at least 5 days weekly. Stay active, limit time of being sedentary.    -4 pillars to weight loss: proper  nutrition, adequate sleep, healthy exercise, and emotional health care.        Counseled on age and gender appropriate medical preventative services, including cancer screenings, immunizations, overall nutritional health, need for a consistent exercise regimen and an overall push towards maintaining a vigorous and active lifestyle.    - Limit: red meat, butter, fried foods, cheese, and other foods that have a lot of saturated fat. Consume: lean meats, fish, fruits, vegetables, whole grains, beans, lentils, and nuts. Adequate daily hydration.  - Instructed on guidelines recommending 150 minutes per week of brisk exercise and healthy lifestyle. Recommended well screenings (including immunizations) reviewed with patient. Discussed outstanding health maintenance and rationale for completion.    Follow up in about 1 year (around 8/27/2025) for Annual.        This note was created using [employer] voice recognition software that occasionally misinterprets phrases or words.

## 2024-09-18 ENCOUNTER — OFFICE VISIT (OUTPATIENT)
Dept: FAMILY MEDICINE | Facility: CLINIC | Age: 44
End: 2024-09-18
Payer: COMMERCIAL

## 2024-09-18 VITALS
TEMPERATURE: 98 F | WEIGHT: 275.81 LBS | HEART RATE: 69 BPM | BODY MASS INDEX: 40.85 KG/M2 | SYSTOLIC BLOOD PRESSURE: 118 MMHG | OXYGEN SATURATION: 99 % | DIASTOLIC BLOOD PRESSURE: 70 MMHG | HEIGHT: 69 IN

## 2024-09-18 DIAGNOSIS — E66.01 CLASS 3 SEVERE OBESITY DUE TO EXCESS CALORIES WITH SERIOUS COMORBIDITY AND BODY MASS INDEX (BMI) OF 40.0 TO 44.9 IN ADULT: Primary | ICD-10-CM

## 2024-09-18 DIAGNOSIS — Z71.3 WEIGHT LOSS COUNSELING, ENCOUNTER FOR: ICD-10-CM

## 2024-09-18 DIAGNOSIS — E66.01 CLASS 3 SEVERE OBESITY DUE TO EXCESS CALORIES WITH SERIOUS COMORBIDITY AND BODY MASS INDEX (BMI) OF 40.0 TO 44.9 IN ADULT: ICD-10-CM

## 2024-09-18 PROCEDURE — 3078F DIAST BP <80 MM HG: CPT | Mod: CPTII,S$GLB,,

## 2024-09-18 PROCEDURE — 3008F BODY MASS INDEX DOCD: CPT | Mod: CPTII,S$GLB,,

## 2024-09-18 PROCEDURE — 99999 PR PBB SHADOW E&M-EST. PATIENT-LVL IV: CPT | Mod: PBBFAC,,,

## 2024-09-18 PROCEDURE — 1159F MED LIST DOCD IN RCRD: CPT | Mod: CPTII,S$GLB,,

## 2024-09-18 PROCEDURE — 3074F SYST BP LT 130 MM HG: CPT | Mod: CPTII,S$GLB,,

## 2024-09-18 PROCEDURE — 1160F RVW MEDS BY RX/DR IN RCRD: CPT | Mod: CPTII,S$GLB,,

## 2024-09-18 PROCEDURE — 99213 OFFICE O/P EST LOW 20 MIN: CPT | Mod: S$GLB,,,

## 2024-09-18 RX ORDER — AZELASTINE 1 MG/ML
2 SPRAY, METERED NASAL 2 TIMES DAILY
COMMUNITY
Start: 2024-08-27

## 2024-09-18 RX ORDER — SEMAGLUTIDE 0.25 MG/.5ML
0.25 INJECTION, SOLUTION SUBCUTANEOUS
Qty: 2 ML | Refills: 0 | Status: SHIPPED | OUTPATIENT
Start: 2024-09-18

## 2024-09-18 RX ORDER — SEMAGLUTIDE 0.25 MG/.5ML
0.25 INJECTION, SOLUTION SUBCUTANEOUS
Qty: 2 ML | Refills: 0 | Status: SHIPPED | OUTPATIENT
Start: 2024-09-18 | End: 2024-09-18 | Stop reason: ALTCHOICE

## 2024-09-18 NOTE — TELEPHONE ENCOUNTER
Called patient to advise patient that his medication was sent to hospital pharmacy due to needing PA. Received voicemail left message for patient.

## 2024-09-18 NOTE — PROGRESS NOTES
SUBJECTIVE:      Patient ID: Roscoe Nagel is a 44 y.o. male.    Chief Complaint: Weight Loss    Wiliam presents today to discuss weight loss treatment. His chronic conditions consist of HLD, YUMIKO, and LPR. Reviewed labs. HLD is controlled with statins at this time. He is would like to discuss medication management to help with weight loss. Reports sweet cravings. 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 Over the last 6 months he has had difficulty with motivation and has been consuming an generally unhealthy diet.       Today's weight 275, BMI 40.73  Exercise: Sedentary lifestyle   Breakfast: sausage and cheese biscuit, anything that is convenient   Lunch- Typically eat out at a Mexican restaurant  Dinner- Typically eats out 95% of the time more Mexican food or fast food.          Review of patient's allergies indicates:  No Known Allergies   Past Medical History:   Diagnosis Date    Asthma     Hyperlipidemia     YUMIKO on CPAP 06/18/2024     Past Surgical History:   Procedure Laterality Date    ELBOW SURGERY Left 03/01/2024    nerve release     Current Outpatient Medications   Medication Sig Dispense Refill    albuterol (PROVENTIL/VENTOLIN HFA) 90 mcg/actuation inhaler Inhale 1-2 puffs into the lungs every 6 (six) hours as needed for Wheezing or Shortness of Breath. Rescue 6.7 g 0    atorvastatin (LIPITOR) 20 MG tablet Take 1 tablet (20 mg total) by mouth every evening. 90 tablet 3    azelastine (ASTELIN) 137 mcg (0.1 %) nasal spray 2 sprays by Nasal route 2 (two) times a day.      famotidine (PEPCID) 40 MG tablet Take 40 mg by mouth every evening.      fluticasone propionate (FLONASE) 50 mcg/actuation nasal spray 1 spray (50 mcg total) by Each Nostril route once daily. 16 g 0    semaglutide, weight loss, (WEGOVY) 0.25 mg/0.5 mL PnIj Inject 0.25 mg into the skin every 7 days. 2 mL 0     No current facility-administered medications for this visit.     Family  History   Problem Relation Name Age of Onset    Cancer Mother      Diabetes Father        Social History     Socioeconomic History    Marital status: Significant Other   Tobacco Use    Smoking status: Never     Passive exposure: Never    Smokeless tobacco: Never   Substance and Sexual Activity    Alcohol use: Yes     Alcohol/week: 5.0 standard drinks of alcohol     Types: 5 Cans of beer per week    Drug use: Not Currently     Types: Cocaine    Sexual activity: Yes     Partners: Female     Social Determinants of Health     Financial Resource Strain: Low Risk  (6/17/2024)    Overall Financial Resource Strain (CARDIA)     Difficulty of Paying Living Expenses: Not hard at all   Food Insecurity: No Food Insecurity (6/17/2024)    Hunger Vital Sign     Worried About Running Out of Food in the Last Year: Never true     Ran Out of Food in the Last Year: Never true   Physical Activity: Insufficiently Active (6/17/2024)    Exercise Vital Sign     Days of Exercise per Week: 3 days     Minutes of Exercise per Session: 40 min   Stress: Stress Concern Present (6/17/2024)    [employer] - Occupational Stress Questionnaire     Feeling of Stress : Rather much   Housing Stability: Unknown (6/17/2024)    Housing Stability Vital Sign     Unable to Pay for Housing in the Last Year: No       Review of Systems   Constitutional:  Negative for chills, fatigue, fever and unexpected weight change.   HENT:  Negative for nasal congestion and trouble swallowing.    Eyes:  Negative for pain, redness and visual disturbance.   Respiratory:  Negative for cough, chest tightness, shortness of breath and wheezing.    Cardiovascular:  Negative for chest pain, palpitations and leg swelling.   Gastrointestinal:  Negative for abdominal pain, change in bowel habit, nausea, vomiting and reflux.   Endocrine: Positive for polyphagia. Negative for polydipsia and polyuria.   Genitourinary:  Negative for frequency, hematuria and  "urgency.   Musculoskeletal:  Negative for arthralgias, back pain, myalgias and neck pain.   Integumentary:  Negative for rash and wound.   Neurological:  Negative for dizziness, syncope, speech difficulty, weakness, light-headedness, numbness and headaches.   Psychiatric/Behavioral:  Negative for confusion, dysphoric mood, self-injury, sleep disturbance and suicidal ideas.       OBJECTIVE:      Vitals:    09/18/24 1417   BP: 118/70   BP Location: Left arm   Patient Position: Sitting   BP Method: Large (Manual)   Pulse: 69   Temp: 98.3 °F (36.8 °C)   TempSrc: Oral   SpO2: 99%   Weight: 125.1 kg (275 lb 12.8 oz)   Height: 5' 9" (1.753 m)     Physical Exam  Vitals and nursing note reviewed.   Constitutional:       General: He is not in acute distress.     Appearance: Normal appearance. He is well-developed. He is obese. He is not ill-appearing.      Comments: Bmi 40   HENT:      Head: Normocephalic and atraumatic.      Nose: Nose normal.      Mouth/Throat:      Mouth: Mucous membranes are moist.      Pharynx: Oropharynx is clear.   Eyes:      General: No scleral icterus.     Extraocular Movements: Extraocular movements intact.      Conjunctiva/sclera: Conjunctivae normal.      Pupils: Pupils are equal, round, and reactive to light.   Neck:      Thyroid: No thyroid mass or thyromegaly.      Trachea: Trachea normal.   Cardiovascular:      Rate and Rhythm: Normal rate and regular rhythm.      Pulses: Normal pulses.      Heart sounds: Normal heart sounds. No murmur heard.  Pulmonary:      Effort: Pulmonary effort is normal. No respiratory distress.      Breath sounds: Normal breath sounds. No wheezing or rales.   Musculoskeletal:         General: Normal range of motion.      Cervical back: Normal range of motion and neck supple.   Skin:     General: Skin is warm and dry.      Coloration: Skin is not pale.      Findings: No rash.   Neurological:      Mental Status: He is alert and oriented to person, place, and time. "   Psychiatric:         Mood and Affect: Mood normal.         Behavior: Behavior normal.         Thought Content: Thought content normal.         Judgment: Judgment normal.        Assessment:       1. Class 3 severe obesity due to excess calories with serious comorbidity and body mass index (BMI) of 40.0 to 44.9 in adult    2. Weight loss counseling, encounter for        Plan:       Class 3 severe obesity due to excess calories with serious comorbidity and body mass index (BMI) of 40.0 to 44.9 in adult  Diet and exercise discussed with patient.  -Consuming 500-1,000 fewer calories per day to lose 1-2 lbs per week   -Lower carbohydrates & saturated fats. Increase fiber with fruits, vegetables and whole grain products. Limit caffeine intake and Increase fluids.  Portion control- Eat when hungry and stop when satisfied.   -Aerobic exercise such as walking, running, biking or swimming 30-45 minutes at least 5 days weekly. Stay active, limit time of being sedentary.    -4 pillars to weight loss: proper nutrition, adequate sleep, healthy exercise, and emotional health care.    -discussed risk factors associated with obesity.  -     semaglutide, weight loss, (WEGOVY) 0.25 mg/0.5 mL PnIj; Inject 0.25 mg into the skin every 7 days.  Dispense: 2 mL; Refill: 0    Weight loss counseling, encounter for      I spent a total of 20 minutes on the day of the visit.  This includes face to face time and non-face to face time preparing to see the patient (eg, review of tests), obtaining and/or reviewing separately obtained history, documenting clinical information in the electronic or other health record, independently interpreting results and communicating results to the patient/family/caregiver, or care coordinator.     Follow up in about 1 month (around 10/18/2024) for weight loss.      9/18/2024 MELECIO Feliciano, STEPHANEP    This note was created using [employer] voice recognition software that occasionally misinterprets phrases or  words.

## 2024-10-03 ENCOUNTER — TELEPHONE (OUTPATIENT)
Dept: FAMILY MEDICINE | Facility: CLINIC | Age: 44
End: 2024-10-03
Payer: COMMERCIAL

## 2024-10-03 NOTE — TELEPHONE ENCOUNTER
Hello,     The prior authorization for Roscoe Nagel's WEGOVY 0.25 MG prescription has been DENIED for the following reason(s): its a plan exclusion.        Patient has been notified of the decision on 10/1/2024. He said he will pay $650 with the Wegovy coupon.    If there are any additional questions or concerns, please contact me.      Thanks,  Delia Prieto  Pharmacy Technician  [facility]  Phone: [phone]  Fax: [phone]

## 2024-12-19 ENCOUNTER — OFFICE VISIT (OUTPATIENT)
Dept: URGENT CARE | Facility: CLINIC | Age: 44
End: 2024-12-19
Payer: COMMERCIAL

## 2024-12-19 VITALS
DIASTOLIC BLOOD PRESSURE: 83 MMHG | OXYGEN SATURATION: 99 % | TEMPERATURE: 98 F | RESPIRATION RATE: 21 BRPM | WEIGHT: 270 LBS | SYSTOLIC BLOOD PRESSURE: 141 MMHG | HEART RATE: 65 BPM | BODY MASS INDEX: 39.99 KG/M2 | HEIGHT: 69 IN

## 2024-12-19 DIAGNOSIS — R09.81 NASAL CONGESTION: ICD-10-CM

## 2024-12-19 DIAGNOSIS — R05.9 COUGH, UNSPECIFIED TYPE: ICD-10-CM

## 2024-12-19 DIAGNOSIS — J01.90 ACUTE BACTERIAL SINUSITIS: Primary | ICD-10-CM

## 2024-12-19 DIAGNOSIS — B96.89 ACUTE BACTERIAL SINUSITIS: Primary | ICD-10-CM

## 2024-12-19 PROCEDURE — 99213 OFFICE O/P EST LOW 20 MIN: CPT | Mod: S$GLB,,, | Performed by: NURSE PRACTITIONER

## 2024-12-19 RX ORDER — IBUPROFEN 600 MG/1
600 TABLET ORAL EVERY 6 HOURS PRN
COMMUNITY
Start: 2024-07-22

## 2024-12-19 RX ORDER — AMOXICILLIN AND CLAVULANATE POTASSIUM 875; 125 MG/1; MG/1
1 TABLET, FILM COATED ORAL 2 TIMES DAILY
COMMUNITY
Start: 2024-12-09

## 2024-12-19 RX ORDER — EPINEPHRINE 0.3 MG/.3ML
INJECTION SUBCUTANEOUS ONCE AS NEEDED
COMMUNITY
Start: 2024-08-27

## 2024-12-19 NOTE — PROGRESS NOTES
"Subjective:       Patient ID: Roscoe Nagel is a 44 y.o. male.    Vitals:  height is 5' 9" (1.753 m) and weight is 122.5 kg (270 lb). His oral temperature is 98.2 °F (36.8 °C). His blood pressure is 141/83 (abnormal) and his pulse is 65. His respiration is 21 (abnormal) and oxygen saturation is 99%.     Chief Complaint: Headache    This is a 44 y.o. male who presents today with a chief complaint of headache and fatigue. Pt has been experiencing chronic sinus problems and has taken multiple rounds of antibiotics and steroids over the past two months. Pt is currently taking Augmentin. the ENT is planning a sinus surgery soon.  He reports a negative influenza and covid test today.   Pt reports cough, nasal congestion and fatigue worsening over the past three days.       Patient presents with:  Headache         Headache   This is a new problem. The problem has been gradually worsening. The pain is at a severity of 7/10. The pain is moderate. Associated symptoms include coughing, sinus pressure and a sore throat. Treatments tried: amoxicillin and steroids. The treatment provided moderate relief.       Constitution: Negative.   HENT:  Positive for sinus pressure and sore throat.    Respiratory:  Positive for cough. Negative for shortness of breath.    Neurological:  Positive for headaches.           Objective:      Physical Exam   Constitutional: He is oriented to person, place, and time. He appears well-developed. He is cooperative.  Non-toxic appearance. He does not appear ill. No distress.   HENT:   Head: Normocephalic and atraumatic.   Ears:   Right Ear: Hearing, tympanic membrane, external ear and ear canal normal.   Left Ear: Hearing, tympanic membrane, external ear and ear canal normal.   Nose: Nose normal. No mucosal edema, rhinorrhea or nasal deformity. No epistaxis. Right sinus exhibits no maxillary sinus tenderness and no frontal sinus tenderness. Left sinus exhibits no maxillary sinus tenderness and no " frontal sinus tenderness.   Mouth/Throat: Uvula is midline, oropharynx is clear and moist and mucous membranes are normal. No trismus in the jaw. Normal dentition. No uvula swelling. Cobblestoning present. No oropharyngeal exudate, posterior oropharyngeal edema or posterior oropharyngeal erythema.   Eyes: Conjunctivae and lids are normal. No scleral icterus.   Neck: Trachea normal and phonation normal. Neck supple. No edema present. No erythema present. No neck rigidity present.   Cardiovascular: Normal rate, regular rhythm, normal heart sounds and normal pulses.   Pulmonary/Chest: Effort normal and breath sounds normal. No respiratory distress. He has no decreased breath sounds. He has no rhonchi.   Abdominal: Normal appearance.   Musculoskeletal: Normal range of motion.         General: No deformity. Normal range of motion.   Neurological: He is alert and oriented to person, place, and time. He exhibits normal muscle tone. Coordination normal.   Skin: Skin is warm, dry, intact, not diaphoretic and not pale.   Psychiatric: His speech is normal and behavior is normal. Judgment and thought content normal.   Nursing note and vitals reviewed.        Past medical history and current medications reviewed.     XR CHEST PA AND LATERAL  Narrative: EXAMINATION:  XR CHEST PA AND LATERAL    CLINICAL HISTORY:  Cough, unspecified    TECHNIQUE:  PA and lateral views of the chest were performed.    COMPARISON:  06/02/2024    FINDINGS:  The lung volumes are lower, exaggerating the size the cardiac silhouette in the prominence of the pulmonary vasculature. There is a pectus carinatum, normal variant.  Lungs are clear.  No pleural effusion or acute bony abnormality.  Impression: Hypoaeration, otherwise normal study.    Electronically signed by: Adia Hill  Date:    12/19/2024  Time:    15:52     Assessment:           1. Acute bacterial sinusitis    2. Cough, unspecified type    3. Nasal congestion              Plan:          Acute bacterial sinusitis    Cough, unspecified type  -     XR CHEST PA AND LATERAL; Future; Expected date: 12/19/2024    Nasal congestion             Patient Instructions   Please return here or go to the Emergency Department for any concerns or worsening of condition.  Please drink plenty of fluids.  Please get plenty of rest.  Continue taking antibiotics as directed by your ENT.   If not allergic, please take over the counter Tylenol (Acetaminophen) and/or Motrin (Ibuprofen) as directed for control of pain and/or fever.  Please follow up with your ENT for further evaluation.     If you  smoke, please stop smoking.           Martinez Levin, MARTHA-C     Medical Decision Making:   Urgent Care Management:  Pt had a negative COVID and flu test today.  Patient's chest x-ray in the clinic is negative for pneumonia.  Patient is also currently taking Augmentin at this time for sinusitis.  Patient's exam with a was negative in the clinic.  Therefore recommend patient to continue taking antibiotics as directed by the ENT and follow with ENT for further evaluation.

## 2024-12-19 NOTE — PATIENT INSTRUCTIONS
Please return here or go to the Emergency Department for any concerns or worsening of condition.  Please drink plenty of fluids.  Please get plenty of rest.  Continue taking antibiotics as directed by your ENT.   If not allergic, please take over the counter Tylenol (Acetaminophen) and/or Motrin (Ibuprofen) as directed for control of pain and/or fever.  Please follow up with your ENT for further evaluation.     If you  smoke, please stop smoking.

## 2025-01-18 ENCOUNTER — OFFICE VISIT (OUTPATIENT)
Dept: URGENT CARE | Facility: CLINIC | Age: 45
End: 2025-01-18

## 2025-01-18 VITALS
HEIGHT: 69 IN | SYSTOLIC BLOOD PRESSURE: 118 MMHG | BODY MASS INDEX: 40.73 KG/M2 | RESPIRATION RATE: 19 BRPM | WEIGHT: 275 LBS | TEMPERATURE: 100 F | OXYGEN SATURATION: 98 % | HEART RATE: 103 BPM | DIASTOLIC BLOOD PRESSURE: 68 MMHG

## 2025-01-18 DIAGNOSIS — R50.9 FEVER, UNSPECIFIED FEVER CAUSE: ICD-10-CM

## 2025-01-18 DIAGNOSIS — J11.1 INFLUENZA: Primary | ICD-10-CM

## 2025-01-18 DIAGNOSIS — R05.1 ACUTE COUGH: ICD-10-CM

## 2025-01-18 LAB
CTP QC/QA: YES
POC MOLECULAR INFLUENZA A AGN: POSITIVE
POC MOLECULAR INFLUENZA B AGN: NEGATIVE

## 2025-01-18 PROCEDURE — 99214 OFFICE O/P EST MOD 30 MIN: CPT | Mod: S$GLB,,, | Performed by: NURSE PRACTITIONER

## 2025-01-18 PROCEDURE — 87502 INFLUENZA DNA AMP PROBE: CPT | Mod: QW,,, | Performed by: NURSE PRACTITIONER

## 2025-01-18 RX ORDER — PROMETHAZINE HYDROCHLORIDE AND DEXTROMETHORPHAN HYDROBROMIDE 6.25; 15 MG/5ML; MG/5ML
5 SYRUP ORAL EVERY 4 HOURS PRN
Qty: 118 ML | Refills: 0 | Status: SHIPPED | OUTPATIENT
Start: 2025-01-18 | End: 2025-01-28

## 2025-01-18 RX ORDER — OSELTAMIVIR PHOSPHATE 75 MG/1
75 CAPSULE ORAL EVERY 12 HOURS
Qty: 10 CAPSULE | Refills: 0 | Status: SHIPPED | OUTPATIENT
Start: 2025-01-18 | End: 2025-01-23

## 2025-01-18 NOTE — PROGRESS NOTES
"Subjective:       Patient ID: Roscoe Nagel is a 44 y.o. male.    Vitals:  height is 5' 9" (1.753 m) and weight is 124.7 kg (275 lb). His oral temperature is 100.3 °F (37.9 °C). His blood pressure is 118/68 and his pulse is 103. His respiration is 19 and oxygen saturation is 98%.     Chief Complaint: Cough    44 y.o. male who presents today with a chief complaint of productive cough, chills, and fever since yesterday.      Cough  This is a new problem. The current episode started yesterday. The problem has been rapidly worsening. The problem occurs constantly. The cough is Productive of sputum. Associated symptoms include chills, ear congestion, a fever, nasal congestion, postnasal drip, a sore throat and wheezing. Nothing aggravates the symptoms. Risk factors for lung disease include animal exposure. Treatments tried: dayquil      nyquil. The treatment provided mild relief.       Constitution: Positive for chills and fever.   HENT:  Positive for postnasal drip and sore throat.    Respiratory:  Positive for cough and wheezing.            Objective:      Physical Exam   Constitutional: He is oriented to person, place, and time.  Non-toxic appearance. He does not appear ill. No distress. obesity  HENT:   Head: Normocephalic and atraumatic.   Ears:   Right Ear: Tympanic membrane, external ear and ear canal normal.   Left Ear: Tympanic membrane, external ear and ear canal normal.   Nose: Congestion present.   Mouth/Throat: Mucous membranes are moist. No posterior oropharyngeal erythema. Oropharynx is clear.   Eyes: Conjunctivae are normal. Extraocular movement intact   Neck: Neck supple. No neck rigidity present.   Cardiovascular: Normal rate, regular rhythm, normal heart sounds and normal pulses.   Pulmonary/Chest: Effort normal and breath sounds normal.   Abdominal: Normal appearance.   Musculoskeletal: Normal range of motion.         General: Normal range of motion.      Cervical back: He exhibits no " tenderness.   Lymphadenopathy:     He has no cervical adenopathy.   Neurological: He is alert and oriented to person, place, and time.   Skin: Skin is warm, dry, not diaphoretic and no rash.   Psychiatric: His behavior is normal.   Vitals reviewed.        Past medical history and current medications reviewed.     Results for orders placed or performed in visit on 01/18/25   POCT Influenza A/B Molecular    Collection Time: 01/18/25 11:47 AM   Result Value Ref Range    POC Molecular Influenza A Ag Positive (A) Negative    POC Molecular Influenza B Ag Negative Negative     Acceptable Yes          Assessment:           1. Influenza    2. Fever, unspecified fever cause    3. Acute cough              Plan:         Influenza  -     oseltamivir (TAMIFLU) 75 MG capsule; Take 1 capsule (75 mg total) by mouth every 12 (twelve) hours. for 5 days  Dispense: 10 capsule; Refill: 0    Fever, unspecified fever cause  -     POCT Influenza A/B Molecular    Acute cough  -     promethazine-dextromethorphan (PROMETHAZINE-DM) 6.25-15 mg/5 mL Syrp; Take 5 mLs by mouth every 4 (four) hours as needed (cough).  Dispense: 118 mL; Refill: 0         INSTRUCTIONS  Meds as prescribed. Rest. Increase oral fluids. Follow up as advised. To ER for worsening of symptoms, or for any new symptoms as discussed.

## 2025-01-27 ENCOUNTER — HOSPITAL ENCOUNTER (OUTPATIENT)
Dept: RADIOLOGY | Facility: HOSPITAL | Age: 45
Discharge: HOME | End: 2025-01-27
Attending: NURSE PRACTITIONER

## 2025-01-27 DIAGNOSIS — R09.89 ABNORMAL LUNG SOUNDS: ICD-10-CM

## 2025-04-02 ENCOUNTER — PATIENT MESSAGE (OUTPATIENT)
Dept: ADMINISTRATIVE | Facility: HOSPITAL | Age: 45
End: 2025-04-02
Payer: COMMERCIAL